# Patient Record
Sex: FEMALE | Race: WHITE | NOT HISPANIC OR LATINO | Employment: OTHER | URBAN - METROPOLITAN AREA
[De-identification: names, ages, dates, MRNs, and addresses within clinical notes are randomized per-mention and may not be internally consistent; named-entity substitution may affect disease eponyms.]

---

## 2019-04-16 ENCOUNTER — OFFICE VISIT (OUTPATIENT)
Dept: ORTHOPEDIC SURGERY | Facility: CLINIC | Age: 74
End: 2019-04-16

## 2019-04-16 VITALS — BODY MASS INDEX: 25.49 KG/M2 | TEMPERATURE: 98.3 F | WEIGHT: 135 LBS | HEIGHT: 61 IN

## 2019-04-16 DIAGNOSIS — M25.551 HIP PAIN, RIGHT: Primary | ICD-10-CM

## 2019-04-16 PROCEDURE — 73502 X-RAY EXAM HIP UNI 2-3 VIEWS: CPT | Performed by: ORTHOPAEDIC SURGERY

## 2019-04-16 PROCEDURE — 99213 OFFICE O/P EST LOW 20 MIN: CPT | Performed by: ORTHOPAEDIC SURGERY

## 2019-04-16 NOTE — PROGRESS NOTES
"Patient: Maria Victoria Garcia  YOB: 1945 73 y.o. female  Medical Record Number: [id number]    Chief Complaint:   Chief Complaint   Patient presents with   • Right Hip - Establish Care       History of Present Illness:Maria Victoria Garcia is a 73 y.o. female who presents for follow-up of right hip.  She is about 18 years out from revision right total hip replacement.  She has known extensive polyethylene wear.  She has soreness and achiness with intermittent catching.  It is worsened moderately since I saw her a couple years ago.    Allergies:   Allergies   Allergen Reactions   • Codeine        Medications:   No current outpatient medications on file.     No current facility-administered medications for this visit.          The following portions of the patient's history were reviewed and updated as appropriate: allergies, current medications, past family history, past medical history, past social history, past surgical history and problem list.    Review of Systems:   A 14 point review of systems was performed. All systems negative except pertinent positives/negative listed in HPI above    Physical Exam:   Vitals:    04/16/19 1425   Temp: 98.3 °F (36.8 °C)   TempSrc: Temporal   Weight: 61.2 kg (135 lb)   Height: 154.9 cm (61\")       General: A and O x 3, ASA, NAD    SCLERA:    Normal    DENTITION:   Normal  Hip:  right                          LEG ALIGNMENT:     Neutral   ,     leg lengths right about 1 inch longer than the left                          GAIT:     Antalgic with moderate Trendelenburg and a long leg gait on the right side                           SKIN:     No abnormality, there is a long straight lateral incision which is well-healed over the lateral aspect of the hip                           RANGE OF MOTION:      Full with mild  joint irritability                          STRENGTH:     4+ / 5    hip flexion and abduction                          DISTAL PULSES:    Paplable                   "        DISTAL SENSATION :   Intact                          LYMPHATICS:     No   lymphadenopathy                          OTHER:          - Negative Stinchfeld test                                                  - Negative log roll                                                  - No Tenderness to palpation trochanteric bursa                                                  - Neg FADIR                                                  - Neg ASHLEY                                                  - No SI tenderness          Radiology:  Xrays 2views (AP bilateral hips, and lateral of the hip) ordered and reviewed for evaluation of hip pain  demonstrating  a well positioned total hip without evidence of  loosening, or osteoarthritis. There is eccentric wear the femoral head consistent with moderate to severe polyethylene wear.    There is ostial lysis about the apex hole centrally.  She has a femoral head with a very long sleeve topics slightly vertical in orientation but otherwise his components appear appropriately positioned.    In comparison to previous films are has been slight progression of wear     Assessment/Plan:  Right hip mechanical symptoms with polyethylene wear.  There is moderate ostial lysis about the apex hole.  Which would need bone grafting The next step would be a polyethylene exchange.  She is very high risk for dislocation given the already lengthened hip the sub-optimal mechanical issues with a thick sleeve relative to the head diameter.  When she is ready to proceed with polyethylene exchange she will call and we will get it scheduled.  As a backup I would have explant and dual mobility with a readapt locking cup          Fred Cobb MD  4/16/2019

## 2020-03-17 ENCOUNTER — OFFICE VISIT (OUTPATIENT)
Dept: ORTHOPEDIC SURGERY | Facility: CLINIC | Age: 75
End: 2020-03-17

## 2020-03-17 VITALS — HEIGHT: 61 IN | WEIGHT: 136 LBS | TEMPERATURE: 97.9 F | BODY MASS INDEX: 25.68 KG/M2

## 2020-03-17 DIAGNOSIS — M25.551 HIP PAIN, RIGHT: Primary | ICD-10-CM

## 2020-03-17 DIAGNOSIS — Z96.641 STATUS POST RIGHT HIP REPLACEMENT: Primary | ICD-10-CM

## 2020-03-17 PROBLEM — M16.9 OA (OSTEOARTHRITIS) OF HIP: Status: ACTIVE | Noted: 2020-03-17

## 2020-03-17 PROCEDURE — 99214 OFFICE O/P EST MOD 30 MIN: CPT | Performed by: ORTHOPAEDIC SURGERY

## 2020-03-17 PROCEDURE — 73502 X-RAY EXAM HIP UNI 2-3 VIEWS: CPT | Performed by: ORTHOPAEDIC SURGERY

## 2020-03-17 RX ORDER — CEFAZOLIN SODIUM 2 G/100ML
2 INJECTION, SOLUTION INTRAVENOUS ONCE
Status: CANCELLED | OUTPATIENT
Start: 2020-07-29 | End: 2020-03-17

## 2020-03-17 RX ORDER — MELOXICAM 15 MG/1
15 TABLET ORAL ONCE
Status: CANCELLED | OUTPATIENT
Start: 2020-07-29 | End: 2020-03-17

## 2020-03-17 RX ORDER — VANCOMYCIN HYDROCHLORIDE 1 G/200ML
15 INJECTION, SOLUTION INTRAVENOUS ONCE
Status: CANCELLED | OUTPATIENT
Start: 2020-07-29 | End: 2020-03-17

## 2020-03-17 RX ORDER — PREGABALIN 75 MG/1
150 CAPSULE ORAL ONCE
Status: CANCELLED | OUTPATIENT
Start: 2020-07-29 | End: 2020-03-17

## 2020-03-17 NOTE — PROGRESS NOTES
"Patient: Maria Victoria Garcia  YOB: 1945 73 y.o. female  Medical Record Number: [id number]     Chief Complaint:       Chief Complaint   Patient presents with   • Right Hip - Establish Care         History of Present Illness:Maria Victoria Garcia is a 73 y.o. female who presents for follow-up of right hip.  She is about 19 years out from revision right total hip replacement.  She has known extensive polyethylene wear.  She has soreness and achiness with intermittent catching.  It is worsened moderately since I saw her a couple years ago.It us worse than last year.     Allergies:        Allergies   Allergen Reactions   • Codeine           Medications:   No current outpatient medications on file.      No current facility-administered medications for this visit.             The following portions of the patient's history were reviewed and updated as appropriate: allergies, current medications, past family history, past medical history, past social history, past surgical history and problem list.     Review of Systems:   A 14 point review of systems was performed. All systems negative except pertinent positives/negative listed in HPI above     Physical Exam:       Vitals:     04/16/19 1425   Temp: 98.3 °F (36.8 °C)   TempSrc: Temporal   Weight: 61.2 kg (135 lb)   Height: 154.9 cm (61\")         General: A and O x 3, ASA, NAD                          SCLERA:    Normal                          DENTITION:   Normal  Hip:  right                          LEG ALIGNMENT:     Neutral   ,     leg lengths right about 1 inch longer than the left                          GAIT:     Antalgic with moderate Trendelenburg and a long leg gait on the right side                           SKIN:     No abnormality, there is a long straight lateral incision which is well-healed over the lateral aspect of the hip                           RANGE OF MOTION:      Full with mild  joint irritability                          STRENGTH:     4+ / " 5    hip flexion and abduction                          DISTAL PULSES:    Paplable                          DISTAL SENSATION :   Intact                          LYMPHATICS:     No   lymphadenopathy                          OTHER:          - Negative Stinchfeld test                                                  - Negative log roll                                                  - No Tenderness to palpation trochanteric bursa                                                  - Neg FADIR                                                  - Neg ASHLEY                                                  - No SI tenderness           Radiology:  Xrays 2views (AP bilateral hips, and lateral of the hip) ordered and reviewed for evaluation of hip pain  demonstrating  a well positioned total hip without evidence of  loosening, or osteoarthritis. There is eccentric wear the femoral head consistent with moderate to severe polyethylene wear.    There is ostial lysis about the apex hole centrally.  She has a femoral head with a very long sleeve topics slightly vertical in orientation but otherwise his components appear appropriately positioned.    In comparison to previous films are has been slight progression of wear     Assessment/Plan:  Right hip mechanical symptoms with polyethylene wear.  There is moderate ostial lysis about the apex hole.  Which would need bone grafting The next step would be a polyethylene exchange.  She is very high risk for dislocation given the already lengthened hip the sub-optimal mechanical issues with a thick sleeve relative to the head diameter.  We will get it scheduled.  As a backup I would have explant and dual mobility with a readapt locking cup

## 2020-05-29 PROBLEM — Z96.641 STATUS POST RIGHT HIP REPLACEMENT: Status: ACTIVE | Noted: 2020-05-29

## 2020-07-23 ENCOUNTER — OFFICE VISIT (OUTPATIENT)
Dept: ORTHOPEDIC SURGERY | Facility: CLINIC | Age: 75
End: 2020-07-23

## 2020-07-23 VITALS
TEMPERATURE: 98.2 F | BODY MASS INDEX: 26.58 KG/M2 | SYSTOLIC BLOOD PRESSURE: 148 MMHG | DIASTOLIC BLOOD PRESSURE: 84 MMHG | HEIGHT: 61 IN | WEIGHT: 140.8 LBS

## 2020-07-23 DIAGNOSIS — Z96.641 STATUS POST RIGHT HIP REPLACEMENT: Primary | ICD-10-CM

## 2020-07-23 PROCEDURE — S0260 H&P FOR SURGERY: HCPCS | Performed by: NURSE PRACTITIONER

## 2020-07-23 PROCEDURE — 73501 X-RAY EXAM HIP UNI 1 VIEW: CPT | Performed by: NURSE PRACTITIONER

## 2020-07-23 NOTE — H&P
"Patient: Maria Victoria Garcia    Date of Admission: 7/29/2020    YOB: 1945    Medical Record Number: [id number]    Admitting Physician: Dr. Fred Cobb    Reason for Admission: End Stage Right Hip OA    History of Present Illness: 75 y.o. female presents with severe hip pain.  She has known advanced polyethylene wear.  She is tried and failed all conservative measures and would like to proceed with surgery.    Allergies:   Allergies   Allergen Reactions   • Codeine          Current Medications:  Home Medications:    No current outpatient medications on file prior to visit.     No current facility-administered medications on file prior to visit.      PRN Meds:.    PMH:  Past Medical History:   Diagnosis Date   • Cancer (CMS/HCC)         PSURGH:  Past Surgical History:   Procedure Laterality Date   • GALLBLADDER SURGERY  08/1998   • HIP SURGERY Bilateral 1989    ALSO 1991; REPLACEMENT       SocialHx:  Social History     Occupational History   • Not on file   Tobacco Use   • Smoking status: Never Smoker   Substance and Sexual Activity   • Alcohol use: No   • Drug use: Not on file   • Sexual activity: Not on file      Social History     Social History Narrative   • Not on file       FamHx:  Family History   Problem Relation Age of Onset   • Lung cancer Other    • Pancreatic cancer Other          Review of Systems:   A 14 point review of systems was performed, pertinent positives discussed above, all other systems are negative    Physical Exam: 75 y.o. female  Vital Signs :   Vitals:    07/23/20 1339   BP: 148/84   Temp: 98.2 °F (36.8 °C)   Weight: 63.9 kg (140 lb 12.8 oz)   Height: 154.9 cm (61\")   PainSc:   6     General: Alert and Oriented x 3, No acute distress.  Psych: mood and affect appropriate; recent and remote memory intact  Eyes: conjunctiva clear; pupils equally round and reactive, sclera nonicteric  CV: no peripheral edema  Resp: normal respiratory effort  Skin: no rashes or wounds; normal " turgor  Musculosketetal; pain with hip range of motion. Positive stinchfeld test. No trochanteric  Tenderness.  Vascular: palpable distal pulses    Labs:    No visits with results within 3 Week(s) from this visit.   Latest known visit with results is:   No results found for any previous visit.     Xrays:  Previous x-rays of the right hip show previously replaced right total hip replacement with signs of polyethylene wear.    Assessment: Advanced polyethylene wear right hip. Conservative measures have failed.      Plan:  The plan is to proceed with Right hip polyethylene exchange with possible acetabular revision. The patient voiced understanding of the risks, benefits, and alternative forms of treatment that were discussed with Dr Cobb at the time of scheduling.  Patient is planning on home with home health next day.  Spinal anesthesia.    Jeannie Pool, APRN  7/23/2020

## 2020-07-27 ENCOUNTER — APPOINTMENT (OUTPATIENT)
Dept: PREADMISSION TESTING | Facility: HOSPITAL | Age: 75
End: 2020-07-27

## 2020-11-30 ENCOUNTER — PREP FOR SURGERY (OUTPATIENT)
Dept: OTHER | Facility: HOSPITAL | Age: 75
End: 2020-11-30

## 2020-11-30 DIAGNOSIS — Z96.641 STATUS POST RIGHT HIP REPLACEMENT: Primary | ICD-10-CM

## 2020-12-01 ENCOUNTER — TRANSCRIBE ORDERS (OUTPATIENT)
Dept: PREADMISSION TESTING | Facility: HOSPITAL | Age: 75
End: 2020-12-01

## 2020-12-01 DIAGNOSIS — Z01.818 OTHER SPECIFIED PRE-OPERATIVE EXAMINATION: Primary | ICD-10-CM

## 2020-12-03 ENCOUNTER — APPOINTMENT (OUTPATIENT)
Dept: PREADMISSION TESTING | Facility: HOSPITAL | Age: 75
End: 2020-12-03

## 2020-12-03 VITALS
TEMPERATURE: 98 F | RESPIRATION RATE: 16 BRPM | BODY MASS INDEX: 26.43 KG/M2 | DIASTOLIC BLOOD PRESSURE: 90 MMHG | SYSTOLIC BLOOD PRESSURE: 164 MMHG | WEIGHT: 140 LBS | HEIGHT: 61 IN | OXYGEN SATURATION: 100 % | HEART RATE: 92 BPM

## 2020-12-03 DIAGNOSIS — Z96.641 STATUS POST RIGHT HIP REPLACEMENT: ICD-10-CM

## 2020-12-03 LAB
BILIRUB UR QL STRIP: NEGATIVE
CLARITY UR: CLEAR
COLOR UR: YELLOW
DEPRECATED RDW RBC AUTO: 40 FL (ref 37–54)
ERYTHROCYTE [DISTWIDTH] IN BLOOD BY AUTOMATED COUNT: 12.4 % (ref 12.3–15.4)
GLUCOSE UR STRIP-MCNC: NEGATIVE MG/DL
HCT VFR BLD AUTO: 38.7 % (ref 34–46.6)
HGB BLD-MCNC: 13.1 G/DL (ref 12–15.9)
HGB UR QL STRIP.AUTO: NEGATIVE
KETONES UR QL STRIP: ABNORMAL
LEUKOCYTE ESTERASE UR QL STRIP.AUTO: NEGATIVE
MCH RBC QN AUTO: 29.8 PG (ref 26.6–33)
MCHC RBC AUTO-ENTMCNC: 33.9 G/DL (ref 31.5–35.7)
MCV RBC AUTO: 88.2 FL (ref 79–97)
NITRITE UR QL STRIP: NEGATIVE
PH UR STRIP.AUTO: <=5 [PH] (ref 5–8)
PLATELET # BLD AUTO: 245 10*3/MM3 (ref 140–450)
PMV BLD AUTO: 9.7 FL (ref 6–12)
PROT UR QL STRIP: NEGATIVE
QT INTERVAL: 395 MS
RBC # BLD AUTO: 4.39 10*6/MM3 (ref 3.77–5.28)
SP GR UR STRIP: 1.02 (ref 1–1.03)
UROBILINOGEN UR QL STRIP: ABNORMAL
WBC # BLD AUTO: 7.64 10*3/MM3 (ref 3.4–10.8)

## 2020-12-03 PROCEDURE — 36415 COLL VENOUS BLD VENIPUNCTURE: CPT

## 2020-12-03 PROCEDURE — 93010 ELECTROCARDIOGRAM REPORT: CPT | Performed by: INTERNAL MEDICINE

## 2020-12-03 PROCEDURE — 81003 URINALYSIS AUTO W/O SCOPE: CPT

## 2020-12-03 PROCEDURE — 85027 COMPLETE CBC AUTOMATED: CPT

## 2020-12-03 PROCEDURE — 93005 ELECTROCARDIOGRAM TRACING: CPT

## 2020-12-03 ASSESSMENT — HOOS JR
HOOS JR SCORE: 58.93
HOOS JR SCORE: 10

## 2020-12-03 NOTE — DISCHARGE INSTRUCTIONS
Take the following medications the morning of surgery:    NONE    TIME OF ARRIVAL WILL BE GIVEN TO YOU BY DR MON'S OFFICE     If you are on prescription narcotic pain medication to control your pain you may also take that medication the morning of surgery.    General Instructions:  • Do not eat solid food after midnight the night before surgery.  • You may drink clear liquids day of surgery but must stop at least one hour before your hospital arrival time.  • It is beneficial for you to have a clear drink that contains carbohydrates the day of surgery.  We suggest a 12 to 20 ounce bottle of Gatorade or Powerade for non-diabetic patients or a 12 to 20 ounce bottle of G2 or Powerade Zero for diabetic patients. (Pediatric patients, are not advised to drink a 12 to 20 ounce carbohydrate drink)    Clear liquids are liquids you can see through.  Nothing red in color.     Plain water                               Sports drinks  Sodas                                   Gelatin (Jell-O)  Fruit juices without pulp such as white grape juice and apple juice  Popsicles that contain no fruit or yogurt  Tea or coffee (no cream or milk added)  Gatorade / Powerade  G2 / Powerade Zero    • Infants may have breast milk up to four hours before surgery.  • Infants drinking formula may drink formula up to six hours before surgery.   • Patients who avoid smoking, chewing tobacco and alcohol for 4 weeks prior to surgery have a reduced risk of post-operative complications.  Quit smoking as many days before surgery as you can.  • Do not smoke, use chewing tobacco or drink alcohol the day of surgery.   • If applicable bring your C-PAP/ BI-PAP machine.  • Bring any papers given to you in the doctor’s office.  • Wear clean comfortable clothes.  • Do not wear contact lenses, false eyelashes or make-up.  Bring a case for your glasses.   • Bring crutches or walker if applicable.  • Remove all piercings.  Leave jewelry and any other valuables at  home.  • Hair extensions with metal clips must be removed prior to surgery.  • The Pre-Admission Testing nurse will instruct you to bring medications if unable to obtain an accurate list in Pre-Admission Testing.        If you were given a blood bank ID arm band remember to bring it with you the day of surgery.    Preventing a Surgical Site Infection:  • For 2 to 3 days before surgery, avoid shaving with a razor because the razor can irritate skin and make it easier to develop an infection.    • Any areas of open skin can increase the risk of a post-operative wound infection by allowing bacteria to enter and travel throughout the body.  Notify your surgeon if you have any skin wounds / rashes even if it is not near the expected surgical site.  The area will need assessed to determine if surgery should be delayed until it is healed.  • The night prior to surgery shower using a fresh bar of anti-bacterial soap (such as Dial) and clean washcloth.  Sleep in a clean bed with clean clothing.  Do not allow pets to sleep with you.  • Shower on the morning of surgery using a fresh bar of anti-bacterial soap (such as Dial) and clean washcloth.  Dry with a clean towel and dress in clean clothing.  • Ask your surgeon if you will be receiving antibiotics prior to surgery.  • Make sure you, your family, and all healthcare providers clean their hands with soap and water or an alcohol based hand  before caring for you or your wound.    Day of surgery:  Your arrival time is approximately two hours before your scheduled surgery time.  Upon arrival, a Pre-op nurse and Anesthesiologist will review your health history, obtain vital signs, and answer questions you may have.  The only belongings needed at this time will be a list of your home medications and if applicable your C-PAP/BI-PAP machine.  A Pre-op nurse will start an IV and you may receive medication in preparation for surgery, including something to help you relax.      Please be aware that surgery does come with discomfort.  We want to make every effort to control your discomfort so please discuss any uncontrolled symptoms with your nurse.   Your doctor will most likely have prescribed pain medications.      If you are going home after surgery you will receive individualized written care instructions before being discharged.  A responsible adult must drive you to and from the hospital on the day of your surgery and stay with you for 24 hours.    If you are staying overnight following surgery, you will be transported to your hospital room following the recovery period.  [facility] has all private rooms.    If you have any questions please call Pre-Admission Testing at [phone].  Deductibles and co-payments are collected on the day of service. Please be prepared to pay the required co-pay, deductible or deposit on the day of service as defined by your plan.    Patient Education for Self-Quarantine Process    Following your COVID testing, we strongly recommend that you do not leave your home after you have been tested for COVID except to get medical care. This includes not going to work, school or to public areas.  If this is not possible for you to do please limit your activities to only required outings.  Be sure to wear a mask when you are with other people, practice social distancing and wash your hands frequently.      The following items provide additional details to keep you safe.  • Wash your hands with soap and water frequently for at least 20 seconds.   • Avoid touching your eyes, nose and mouth with unwashed hands.  • Do not share anything - utensils, towels, food from the same bowl.   • Have your own utensils, drinking glass, dishes, towels and bedding.   • Do not have visitors.   • Do use FaceTime to stay in touch with family and friends.  • You should stay in a specific room away from others if possible.   • Stay at least 6 feet away from others  in the home if you cannot have a dedicated room to yourself.   • Do not snuggle with your pet. While the CDC says there is no evidence that pets can spread COVID-19 or be infected from humans, it is probably best to avoid “petting, snuggling, being kissed or licked and sharing food (during self-quarantine)”, according to the CDC.   • Sanitize household surfaces daily. Include all high touch areas (door handles, light switches, phones, countertops, etc.)  • Do not share a bathroom with others, if possible.   • Wear a mask around others in your home if you are unable to stay in a separate room or 6 feet apart. If  you are unable to wear a mask, have your family member wear a mask if they must be within 6 feet of you.   Call your surgeon immediately if you experience any of the following symptoms:  • Sore Throat  • Shortness of Breath or difficulty breathing  • Cough  • Chills  • Body soreness or muscle pain  • Headache  • Fever  • New loss of taste or smell  • Do not arrive for your surgery ill.  Your procedure will need to be rescheduled to another time.  You will need to call your physician before the day of surgery to avoid any unnecessary exposure to hospital staff as well as other patients.    CHLORHEXIDINE CLOTH INSTRUCTIONS  The morning of surgery follow these instructions using the Chlorhexidine cloths you've been given.  These steps reduce bacteria on the body.  Do not use the cloths near your eyes, ears mouth, genitalia or on open wounds.  Throw the cloths away after use but do not try to flush them down a toilet.      • Open and remove one cloth at a time from the package.    • Leave the cloth unfolded and begin the bathing.  • Massage the skin with the cloths using gentle pressure to remove bacteria.  Do not scrub harshly.   • Follow the steps below with one 2% CHG cloth per area (6 total cloths).  • One cloth for neck, shoulders and chest.  • One cloth for both arms, hands, fingers and underarms (do  underarms last).  • One cloth for the abdomen followed by groin.  • One cloth for right leg and foot including between the toes.  • One cloth for left leg and foot including between the toes.  • The last cloth is to be used for the back of the neck, back and buttocks.    Allow the CHG to air dry 3 minutes on the skin which will give it time to work and decrease the chance of irritation.  The skin may feel sticky until it is dry.  Do not rinse with water or any other liquid or you will lose the beneficial effects of the CHG.  If mild skin irritation occurs, do rinse the skin to remove the CHG.  Report this to the nurse at time of admission.  Do not apply lotions, creams, ointments, deodorants or perfumes after using the clothes. Dress in clean clothes before coming to the hospital.    BACTROBAN NASAL OINTMENT  There are many germs normally in your nose. Bactroban is an ointment that will help reduce these germs. Please follow these instructions for Bactroban use:      ____The day before surgery in the morning  Date________    ____The day before surgery in the evening              Date________    ____The day of surgery in the morning    Date________    **Squirt ½ package of Bactroban Ointment onto a cotton applicator and apply to inside of 1st nostril.  Squirt the remaining Bactroban and apply to the inside of the other nostril.    PERIDEX- ORAL:  Use only if your surgeon has ordered  Use the night before and morning of surgery - Swish, gargle, and spit - do not swallow.

## 2020-12-10 ENCOUNTER — OFFICE VISIT (OUTPATIENT)
Dept: ORTHOPEDIC SURGERY | Facility: CLINIC | Age: 75
End: 2020-12-10

## 2020-12-10 VITALS
TEMPERATURE: 97.5 F | SYSTOLIC BLOOD PRESSURE: 148 MMHG | BODY MASS INDEX: 27.94 KG/M2 | WEIGHT: 138.6 LBS | DIASTOLIC BLOOD PRESSURE: 52 MMHG | HEIGHT: 59 IN

## 2020-12-10 DIAGNOSIS — Z96.641 STATUS POST RIGHT HIP REPLACEMENT: Primary | ICD-10-CM

## 2020-12-10 PROCEDURE — S0260 H&P FOR SURGERY: HCPCS | Performed by: NURSE PRACTITIONER

## 2020-12-10 NOTE — H&P (VIEW-ONLY)
Patient: Maria Victoria Garcia    Date of Admission: 12/16/2020    YOB: 1945    Medical Record Number: [id number]    Admitting Physician: Dr. Fred Cobb    Reason for Admission: Right hip polyexchange    History of Present Illness: 75 y.o. 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  She has tried and failed all conservative measures would like to proceed with polyexchange    Allergies:   Allergies   Allergen Reactions   • Codeine Nausea And Vomiting         Current Medications:  Home Medications:    Current Outpatient Medications on File Prior to Visit   Medication Sig   • Chlorhexidine Gluconate 2 % pads Apply  topically. As directed pre op   • mupirocin (BACTROBAN) 2 % nasal ointment into the nostril(s) as directed by provider.     Current Facility-Administered Medications on File Prior to Visit   Medication   • Chlorhexidine Gluconate 2 % pads 1 each     PRN Meds:.    PMH:  Past Medical History:   Diagnosis Date   • Cancer (CMS/HCC)    • History of gallbladder cancer    • PONV (postoperative nausea and vomiting)    • Right hip pain         PSURGH:  Past Surgical History:   Procedure Laterality Date   • APPENDECTOMY     • COLONOSCOPY     • GALLBLADDER SURGERY  08/1998   • HIP SURGERY Bilateral 1989    ALSO 1991; REPLACEMENT   • NASAL SEPTUM SURGERY     • ORIF ANKLE FRACTURE Left        SocialHx:  Social History     Occupational History   • Not on file   Tobacco Use   • Smoking status: Never Smoker   Substance and Sexual Activity   • Alcohol use: No   • Drug use: Never   • Sexual activity: Defer      Social History     Social History Narrative   • Not on file       FamHx:  Family History   Problem Relation Age of Onset   • Lung cancer Other    • Pancreatic cancer Other    • Malig Hyperthermia Neg Hx          Review of Systems:   A 14 point review of systems was performed, pertinent positives discussed above, all other systems  "are negative    Physical Exam: 75 y.o. female  Vital Signs :   Vitals:    12/10/20 1318   BP: 148/52   Temp: 97.5 °F (36.4 °C)   Weight: 62.9 kg (138 lb 9.6 oz)   Height: 149.9 cm (59\")   PainSc:   5     General: Alert and Oriented x 3, No acute distress.  Psych: mood and affect appropriate; recent and remote memory intact  Eyes: conjunctiva clear; pupils equally round and reactive, sclera nonicteric  CV: no peripheral edema  Resp: normal respiratory effort  Skin: no rashes or wounds; normal turgor  Musculosketetal; pain with hip range of motion. Positive stinchfeld test. No trochanteric  Tenderness.  Vascular: palpable distal pulses    Labs:    Appointment on 12/03/2020   Component Date Value Ref Range Status   • WBC 12/03/2020 7.64  3.40 - 10.80 10*3/mm3 Final   • RBC 12/03/2020 4.39  3.77 - 5.28 10*6/mm3 Final   • Hemoglobin 12/03/2020 13.1  12.0 - 15.9 g/dL Final   • Hematocrit 12/03/2020 38.7  34.0 - 46.6 % Final   • MCV 12/03/2020 88.2  79.0 - 97.0 fL Final   • MCH 12/03/2020 29.8  26.6 - 33.0 pg Final   • MCHC 12/03/2020 33.9  31.5 - 35.7 g/dL Final   • RDW 12/03/2020 12.4  12.3 - 15.4 % Final   • RDW-SD 12/03/2020 40.0  37.0 - 54.0 fl Final   • MPV 12/03/2020 9.7  6.0 - 12.0 fL Final   • Platelets 12/03/2020 245  140 - 450 10*3/mm3 Final   • QT Interval 12/03/2020 395  ms Final   • Color, UA 12/03/2020 Yellow  Yellow, Straw Final   • Appearance, UA 12/03/2020 Clear  Clear Final   • pH, UA 12/03/2020 <=5.0  5.0 - 8.0 Final   • Specific Gravity, UA 12/03/2020 1.017  1.005 - 1.030 Final   • Glucose, UA 12/03/2020 Negative  Negative Final   • Ketones, UA 12/03/2020 Trace* Negative Final   • Bilirubin, UA 12/03/2020 Negative  Negative Final   • Blood, UA 12/03/2020 Negative  Negative Final   • Protein, UA 12/03/2020 Negative  Negative Final   • Leuk Esterase, UA 12/03/2020 Negative  Negative Final   • Nitrite, UA 12/03/2020 Negative  Negative Final   • Urobilinogen, UA 12/03/2020 0.2 E.U./dL  0.2 - 1.0 E.U./dL " Final     Xrays:  Previous x-rays were reviewed show previously replaced right hip with signs of polyethylene wear    Assessment: Advanced polyethylene wear right hip conservative measures have failed.      Plan:  The plan is to proceed with right hip polyexchange patient is planning on going home the next day    Jeannie Pool, APRN  12/10/2020

## 2020-12-10 NOTE — H&P
Patient: Maria Victoria Garcia    Date of Admission: 12/16/2020    YOB: 1945    Medical Record Number: [id number]    Admitting Physician: Dr. Fred Cobb    Reason for Admission: Right hip polyexchange    History of Present Illness: 75 y.o. 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  She has tried and failed all conservative measures would like to proceed with polyexchange    Allergies:   Allergies   Allergen Reactions   • Codeine Nausea And Vomiting         Current Medications:  Home Medications:    Current Outpatient Medications on File Prior to Visit   Medication Sig   • Chlorhexidine Gluconate 2 % pads Apply  topically. As directed pre op   • mupirocin (BACTROBAN) 2 % nasal ointment into the nostril(s) as directed by provider.     Current Facility-Administered Medications on File Prior to Visit   Medication   • Chlorhexidine Gluconate 2 % pads 1 each     PRN Meds:.    PMH:  Past Medical History:   Diagnosis Date   • Cancer (CMS/HCC)    • History of gallbladder cancer    • PONV (postoperative nausea and vomiting)    • Right hip pain         PSURGH:  Past Surgical History:   Procedure Laterality Date   • APPENDECTOMY     • COLONOSCOPY     • GALLBLADDER SURGERY  08/1998   • HIP SURGERY Bilateral 1989    ALSO 1991; REPLACEMENT   • NASAL SEPTUM SURGERY     • ORIF ANKLE FRACTURE Left        SocialHx:  Social History     Occupational History   • Not on file   Tobacco Use   • Smoking status: Never Smoker   Substance and Sexual Activity   • Alcohol use: No   • Drug use: Never   • Sexual activity: Defer      Social History     Social History Narrative   • Not on file       FamHx:  Family History   Problem Relation Age of Onset   • Lung cancer Other    • Pancreatic cancer Other    • Malig Hyperthermia Neg Hx          Review of Systems:   A 14 point review of systems was performed, pertinent positives discussed above, all other systems  "are negative    Physical Exam: 75 y.o. female  Vital Signs :   Vitals:    12/10/20 1318   BP: 148/52   Temp: 97.5 °F (36.4 °C)   Weight: 62.9 kg (138 lb 9.6 oz)   Height: 149.9 cm (59\")   PainSc:   5     General: Alert and Oriented x 3, No acute distress.  Psych: mood and affect appropriate; recent and remote memory intact  Eyes: conjunctiva clear; pupils equally round and reactive, sclera nonicteric  CV: no peripheral edema  Resp: normal respiratory effort  Skin: no rashes or wounds; normal turgor  Musculosketetal; pain with hip range of motion. Positive stinchfeld test. No trochanteric  Tenderness.  Vascular: palpable distal pulses    Labs:    Appointment on 12/03/2020   Component Date Value Ref Range Status   • WBC 12/03/2020 7.64  3.40 - 10.80 10*3/mm3 Final   • RBC 12/03/2020 4.39  3.77 - 5.28 10*6/mm3 Final   • Hemoglobin 12/03/2020 13.1  12.0 - 15.9 g/dL Final   • Hematocrit 12/03/2020 38.7  34.0 - 46.6 % Final   • MCV 12/03/2020 88.2  79.0 - 97.0 fL Final   • MCH 12/03/2020 29.8  26.6 - 33.0 pg Final   • MCHC 12/03/2020 33.9  31.5 - 35.7 g/dL Final   • RDW 12/03/2020 12.4  12.3 - 15.4 % Final   • RDW-SD 12/03/2020 40.0  37.0 - 54.0 fl Final   • MPV 12/03/2020 9.7  6.0 - 12.0 fL Final   • Platelets 12/03/2020 245  140 - 450 10*3/mm3 Final   • QT Interval 12/03/2020 395  ms Final   • Color, UA 12/03/2020 Yellow  Yellow, Straw Final   • Appearance, UA 12/03/2020 Clear  Clear Final   • pH, UA 12/03/2020 <=5.0  5.0 - 8.0 Final   • Specific Gravity, UA 12/03/2020 1.017  1.005 - 1.030 Final   • Glucose, UA 12/03/2020 Negative  Negative Final   • Ketones, UA 12/03/2020 Trace* Negative Final   • Bilirubin, UA 12/03/2020 Negative  Negative Final   • Blood, UA 12/03/2020 Negative  Negative Final   • Protein, UA 12/03/2020 Negative  Negative Final   • Leuk Esterase, UA 12/03/2020 Negative  Negative Final   • Nitrite, UA 12/03/2020 Negative  Negative Final   • Urobilinogen, UA 12/03/2020 0.2 E.U./dL  0.2 - 1.0 E.U./dL " Final     Xrays:  Previous x-rays were reviewed show previously replaced right hip with signs of polyethylene wear    Assessment: Advanced polyethylene wear right hip conservative measures have failed.      Plan:  The plan is to proceed with right hip polyexchange patient is planning on going home the next day    Jeannie Pool, APRN  12/10/2020

## 2020-12-14 ENCOUNTER — LAB (OUTPATIENT)
Dept: LAB | Facility: HOSPITAL | Age: 75
End: 2020-12-14

## 2020-12-14 DIAGNOSIS — Z01.818 OTHER SPECIFIED PRE-OPERATIVE EXAMINATION: ICD-10-CM

## 2020-12-14 PROCEDURE — C9803 HOPD COVID-19 SPEC COLLECT: HCPCS

## 2020-12-14 PROCEDURE — U0004 COV-19 TEST NON-CDC HGH THRU: HCPCS

## 2020-12-15 LAB — SARS-COV-2 RNA RESP QL NAA+PROBE: NOT DETECTED

## 2020-12-16 ENCOUNTER — ANESTHESIA EVENT (OUTPATIENT)
Dept: PERIOP | Facility: HOSPITAL | Age: 75
End: 2020-12-16

## 2020-12-16 ENCOUNTER — HOSPITAL ENCOUNTER (INPATIENT)
Facility: HOSPITAL | Age: 75
LOS: 1 days | Discharge: HOME HEALTH | End: 2020-12-17
Attending: ORTHOPAEDIC SURGERY | Admitting: ORTHOPAEDIC SURGERY

## 2020-12-16 ENCOUNTER — APPOINTMENT (OUTPATIENT)
Dept: GENERAL RADIOLOGY | Facility: HOSPITAL | Age: 75
End: 2020-12-16

## 2020-12-16 ENCOUNTER — ANESTHESIA (OUTPATIENT)
Dept: PERIOP | Facility: HOSPITAL | Age: 75
End: 2020-12-16

## 2020-12-16 DIAGNOSIS — Z96.641 STATUS POST RIGHT HIP REPLACEMENT: ICD-10-CM

## 2020-12-16 LAB
ABO GROUP BLD: NORMAL
ANION GAP SERPL CALCULATED.3IONS-SCNC: 10 MMOL/L (ref 5–15)
BLD GP AB SCN SERPL QL: NEGATIVE
BUN SERPL-MCNC: 11 MG/DL (ref 8–23)
BUN/CREAT SERPL: 14.1 (ref 7–25)
CALCIUM SPEC-SCNC: 8.9 MG/DL (ref 8.6–10.5)
CHLORIDE SERPL-SCNC: 103 MMOL/L (ref 98–107)
CO2 SERPL-SCNC: 26 MMOL/L (ref 22–29)
CREAT SERPL-MCNC: 0.78 MG/DL (ref 0.57–1)
GFR SERPL CREATININE-BSD FRML MDRD: 72 ML/MIN/1.73
GLUCOSE SERPL-MCNC: 93 MG/DL (ref 65–99)
POTASSIUM SERPL-SCNC: 3.9 MMOL/L (ref 3.5–5.2)
RH BLD: POSITIVE
SODIUM SERPL-SCNC: 139 MMOL/L (ref 136–145)
T&S EXPIRATION DATE: NORMAL

## 2020-12-16 PROCEDURE — 73501 X-RAY EXAM HIP UNI 1 VIEW: CPT

## 2020-12-16 PROCEDURE — 25010000002 ONDANSETRON PER 1 MG: Performed by: ANESTHESIOLOGY

## 2020-12-16 PROCEDURE — 27137 REVISE HIP JOINT REPLACEMENT: CPT | Performed by: ORTHOPAEDIC SURGERY

## 2020-12-16 PROCEDURE — 25010000002 FENTANYL CITRATE (PF) 100 MCG/2ML SOLUTION: Performed by: ANESTHESIOLOGY

## 2020-12-16 PROCEDURE — 25010000002 DEXAMETHASONE PER 1 MG: Performed by: ANESTHESIOLOGY

## 2020-12-16 PROCEDURE — 0SUA09Z SUPPLEMENT RIGHT HIP JOINT, ACETABULAR SURFACE WITH LINER, OPEN APPROACH: ICD-10-PCS | Performed by: ORTHOPAEDIC SURGERY

## 2020-12-16 PROCEDURE — 86901 BLOOD TYPING SEROLOGIC RH(D): CPT | Performed by: ORTHOPAEDIC SURGERY

## 2020-12-16 PROCEDURE — 0SP909Z REMOVAL OF LINER FROM RIGHT HIP JOINT, OPEN APPROACH: ICD-10-PCS | Performed by: ORTHOPAEDIC SURGERY

## 2020-12-16 PROCEDURE — C1776 JOINT DEVICE (IMPLANTABLE): HCPCS | Performed by: ORTHOPAEDIC SURGERY

## 2020-12-16 PROCEDURE — 25010000002 EPINEPHRINE 30 MG/30ML SOLUTION: Performed by: ORTHOPAEDIC SURGERY

## 2020-12-16 PROCEDURE — 0SPR0JZ REMOVAL OF SYNTHETIC SUBSTITUTE FROM RIGHT HIP JOINT, FEMORAL SURFACE, OPEN APPROACH: ICD-10-PCS | Performed by: ORTHOPAEDIC SURGERY

## 2020-12-16 PROCEDURE — 25010000002 MORPHINE PER 10 MG: Performed by: ORTHOPAEDIC SURGERY

## 2020-12-16 PROCEDURE — 25010000002 KETOROLAC TROMETHAMINE PER 15 MG: Performed by: NURSE PRACTITIONER

## 2020-12-16 PROCEDURE — 25010000003 CEFAZOLIN IN DEXTROSE 2-4 GM/100ML-% SOLUTION: Performed by: ORTHOPAEDIC SURGERY

## 2020-12-16 PROCEDURE — 86900 BLOOD TYPING SEROLOGIC ABO: CPT | Performed by: ORTHOPAEDIC SURGERY

## 2020-12-16 PROCEDURE — 86850 RBC ANTIBODY SCREEN: CPT | Performed by: ORTHOPAEDIC SURGERY

## 2020-12-16 PROCEDURE — 25010000002 ROPIVACAINE PER 1 MG: Performed by: ORTHOPAEDIC SURGERY

## 2020-12-16 PROCEDURE — 25010000002 PHENYLEPHRINE PER 1 ML: Performed by: ANESTHESIOLOGY

## 2020-12-16 PROCEDURE — 80048 BASIC METABOLIC PNL TOTAL CA: CPT | Performed by: ORTHOPAEDIC SURGERY

## 2020-12-16 PROCEDURE — 0SRR0JA REPLACEMENT OF RIGHT HIP JOINT, FEMORAL SURFACE WITH SYNTHETIC SUBSTITUTE, UNCEMENTED, OPEN APPROACH: ICD-10-PCS | Performed by: ORTHOPAEDIC SURGERY

## 2020-12-16 PROCEDURE — 25010000002 VANCOMYCIN PER 500 MG: Performed by: ORTHOPAEDIC SURGERY

## 2020-12-16 PROCEDURE — 25010000002 KETOROLAC TROMETHAMINE PER 15 MG: Performed by: ORTHOPAEDIC SURGERY

## 2020-12-16 PROCEDURE — 25010000002 PROPOFOL 10 MG/ML EMULSION: Performed by: ANESTHESIOLOGY

## 2020-12-16 DEVICE — VIOLET ANTIBACTERIAL POLYDIOXANONE, KNOTLESS TISSUE CONTROL DEVICE
Type: IMPLANTABLE DEVICE | Site: HIP | Status: FUNCTIONAL
Brand: STRATAFIX

## 2020-12-16 DEVICE — TOTAL HIP BALL FEMORAL HEAD DIAMETER 32MM +18 14/16 TAPER: Type: IMPLANTABLE DEVICE | Site: HIP | Status: FUNCTIONAL

## 2020-12-16 DEVICE — DURALOC MARATHON ACETABULAR LINER 10 DEGREES LIP 32MM ID 52MM OD
Type: IMPLANTABLE DEVICE | Site: HIP | Status: FUNCTIONAL
Brand: DURALOC

## 2020-12-16 RX ORDER — FENTANYL CITRATE 50 UG/ML
50 INJECTION, SOLUTION INTRAMUSCULAR; INTRAVENOUS
Status: DISCONTINUED | OUTPATIENT
Start: 2020-12-16 | End: 2020-12-16 | Stop reason: HOSPADM

## 2020-12-16 RX ORDER — CEFAZOLIN SODIUM 2 G/100ML
2 INJECTION, SOLUTION INTRAVENOUS EVERY 8 HOURS
Status: COMPLETED | OUTPATIENT
Start: 2020-12-17 | End: 2020-12-17

## 2020-12-16 RX ORDER — GLYCOPYRROLATE 0.2 MG/ML
INJECTION INTRAMUSCULAR; INTRAVENOUS AS NEEDED
Status: DISCONTINUED | OUTPATIENT
Start: 2020-12-16 | End: 2020-12-16 | Stop reason: SURG

## 2020-12-16 RX ORDER — ASPIRIN 81 MG/1
81 TABLET ORAL EVERY 12 HOURS SCHEDULED
Status: DISCONTINUED | OUTPATIENT
Start: 2020-12-17 | End: 2020-12-17 | Stop reason: HOSPADM

## 2020-12-16 RX ORDER — PREGABALIN 75 MG/1
150 CAPSULE ORAL ONCE
Status: COMPLETED | OUTPATIENT
Start: 2020-12-16 | End: 2020-12-16

## 2020-12-16 RX ORDER — SODIUM CHLORIDE, SODIUM LACTATE, POTASSIUM CHLORIDE, CALCIUM CHLORIDE 600; 310; 30; 20 MG/100ML; MG/100ML; MG/100ML; MG/100ML
9 INJECTION, SOLUTION INTRAVENOUS CONTINUOUS
Status: DISCONTINUED | OUTPATIENT
Start: 2020-12-16 | End: 2020-12-16 | Stop reason: HOSPADM

## 2020-12-16 RX ORDER — PROMETHAZINE HYDROCHLORIDE 25 MG/1
25 TABLET ORAL ONCE AS NEEDED
Status: DISCONTINUED | OUTPATIENT
Start: 2020-12-16 | End: 2020-12-16 | Stop reason: HOSPADM

## 2020-12-16 RX ORDER — ROCURONIUM BROMIDE 10 MG/ML
INJECTION, SOLUTION INTRAVENOUS AS NEEDED
Status: DISCONTINUED | OUTPATIENT
Start: 2020-12-16 | End: 2020-12-16 | Stop reason: SURG

## 2020-12-16 RX ORDER — MELOXICAM 15 MG/1
15 TABLET ORAL ONCE
Status: COMPLETED | OUTPATIENT
Start: 2020-12-16 | End: 2020-12-16

## 2020-12-16 RX ORDER — NALOXONE HCL 0.4 MG/ML
0.2 VIAL (ML) INJECTION AS NEEDED
Status: DISCONTINUED | OUTPATIENT
Start: 2020-12-16 | End: 2020-12-16 | Stop reason: HOSPADM

## 2020-12-16 RX ORDER — PROPOFOL 10 MG/ML
VIAL (ML) INTRAVENOUS AS NEEDED
Status: DISCONTINUED | OUTPATIENT
Start: 2020-12-16 | End: 2020-12-16 | Stop reason: SURG

## 2020-12-16 RX ORDER — DEXAMETHASONE SODIUM PHOSPHATE 10 MG/ML
INJECTION INTRAMUSCULAR; INTRAVENOUS AS NEEDED
Status: DISCONTINUED | OUTPATIENT
Start: 2020-12-16 | End: 2020-12-16 | Stop reason: SURG

## 2020-12-16 RX ORDER — HYDROMORPHONE HYDROCHLORIDE 1 MG/ML
0.5 INJECTION, SOLUTION INTRAMUSCULAR; INTRAVENOUS; SUBCUTANEOUS
Status: DISCONTINUED | OUTPATIENT
Start: 2020-12-16 | End: 2020-12-16 | Stop reason: HOSPADM

## 2020-12-16 RX ORDER — HYDROMORPHONE HYDROCHLORIDE 2 MG/1
2 TABLET ORAL EVERY 4 HOURS PRN
Status: DISCONTINUED | OUTPATIENT
Start: 2020-12-16 | End: 2020-12-17 | Stop reason: HOSPADM

## 2020-12-16 RX ORDER — ACETAMINOPHEN 10 MG/ML
1000 INJECTION, SOLUTION INTRAVENOUS ONCE
Status: COMPLETED | OUTPATIENT
Start: 2020-12-16 | End: 2020-12-16

## 2020-12-16 RX ORDER — FENTANYL CITRATE 50 UG/ML
INJECTION, SOLUTION INTRAMUSCULAR; INTRAVENOUS AS NEEDED
Status: DISCONTINUED | OUTPATIENT
Start: 2020-12-16 | End: 2020-12-16 | Stop reason: SURG

## 2020-12-16 RX ORDER — CEFAZOLIN SODIUM 2 G/100ML
2 INJECTION, SOLUTION INTRAVENOUS ONCE
Status: COMPLETED | OUTPATIENT
Start: 2020-12-16 | End: 2020-12-16

## 2020-12-16 RX ORDER — VANCOMYCIN HYDROCHLORIDE 1 G/200ML
15 INJECTION, SOLUTION INTRAVENOUS ONCE
Status: COMPLETED | OUTPATIENT
Start: 2020-12-16 | End: 2020-12-16

## 2020-12-16 RX ORDER — SODIUM CHLORIDE 0.9 % (FLUSH) 0.9 %
3-10 SYRINGE (ML) INJECTION AS NEEDED
Status: DISCONTINUED | OUTPATIENT
Start: 2020-12-16 | End: 2020-12-16 | Stop reason: HOSPADM

## 2020-12-16 RX ORDER — ACETAMINOPHEN 325 MG/1
325 TABLET ORAL EVERY 4 HOURS PRN
Status: DISCONTINUED | OUTPATIENT
Start: 2020-12-16 | End: 2020-12-17 | Stop reason: HOSPADM

## 2020-12-16 RX ORDER — ONDANSETRON 2 MG/ML
INJECTION INTRAMUSCULAR; INTRAVENOUS AS NEEDED
Status: DISCONTINUED | OUTPATIENT
Start: 2020-12-16 | End: 2020-12-16 | Stop reason: SURG

## 2020-12-16 RX ORDER — DIPHENHYDRAMINE HCL 25 MG
25 CAPSULE ORAL
Status: DISCONTINUED | OUTPATIENT
Start: 2020-12-16 | End: 2020-12-16 | Stop reason: HOSPADM

## 2020-12-16 RX ORDER — ONDANSETRON 2 MG/ML
4 INJECTION INTRAMUSCULAR; INTRAVENOUS EVERY 6 HOURS PRN
Status: DISCONTINUED | OUTPATIENT
Start: 2020-12-16 | End: 2020-12-17 | Stop reason: HOSPADM

## 2020-12-16 RX ORDER — SCOLOPAMINE TRANSDERMAL SYSTEM 1 MG/1
1 PATCH, EXTENDED RELEASE TRANSDERMAL ONCE
Status: DISCONTINUED | OUTPATIENT
Start: 2020-12-16 | End: 2020-12-16

## 2020-12-16 RX ORDER — MIDAZOLAM HYDROCHLORIDE 1 MG/ML
1 INJECTION INTRAMUSCULAR; INTRAVENOUS
Status: DISCONTINUED | OUTPATIENT
Start: 2020-12-16 | End: 2020-12-16 | Stop reason: HOSPADM

## 2020-12-16 RX ORDER — LIDOCAINE HYDROCHLORIDE 20 MG/ML
INJECTION, SOLUTION INFILTRATION; PERINEURAL AS NEEDED
Status: DISCONTINUED | OUTPATIENT
Start: 2020-12-16 | End: 2020-12-16 | Stop reason: SURG

## 2020-12-16 RX ORDER — PANTOPRAZOLE SODIUM 40 MG/1
40 TABLET, DELAYED RELEASE ORAL
Status: DISCONTINUED | OUTPATIENT
Start: 2020-12-17 | End: 2020-12-17 | Stop reason: HOSPADM

## 2020-12-16 RX ORDER — EPHEDRINE SULFATE 50 MG/ML
5 INJECTION, SOLUTION INTRAVENOUS ONCE AS NEEDED
Status: DISCONTINUED | OUTPATIENT
Start: 2020-12-16 | End: 2020-12-16 | Stop reason: HOSPADM

## 2020-12-16 RX ORDER — DIPHENHYDRAMINE HYDROCHLORIDE 50 MG/ML
12.5 INJECTION INTRAMUSCULAR; INTRAVENOUS
Status: DISCONTINUED | OUTPATIENT
Start: 2020-12-16 | End: 2020-12-16 | Stop reason: HOSPADM

## 2020-12-16 RX ORDER — KETOROLAC TROMETHAMINE 30 MG/ML
15 INJECTION, SOLUTION INTRAMUSCULAR; INTRAVENOUS EVERY 8 HOURS
Status: DISCONTINUED | OUTPATIENT
Start: 2020-12-16 | End: 2020-12-17 | Stop reason: HOSPADM

## 2020-12-16 RX ORDER — POLYETHYLENE GLYCOL 3350 17 G/17G
17 POWDER, FOR SOLUTION ORAL 2 TIMES DAILY
Status: DISCONTINUED | OUTPATIENT
Start: 2020-12-16 | End: 2020-12-17 | Stop reason: HOSPADM

## 2020-12-16 RX ORDER — FAMOTIDINE 10 MG/ML
20 INJECTION, SOLUTION INTRAVENOUS ONCE
Status: COMPLETED | OUTPATIENT
Start: 2020-12-16 | End: 2020-12-16

## 2020-12-16 RX ORDER — TRANEXAMIC ACID 100 MG/ML
INJECTION, SOLUTION INTRAVENOUS AS NEEDED
Status: DISCONTINUED | OUTPATIENT
Start: 2020-12-16 | End: 2020-12-16 | Stop reason: SURG

## 2020-12-16 RX ORDER — HYDROCODONE BITARTRATE AND ACETAMINOPHEN 7.5; 325 MG/1; MG/1
1 TABLET ORAL ONCE AS NEEDED
Status: DISCONTINUED | OUTPATIENT
Start: 2020-12-16 | End: 2020-12-16 | Stop reason: HOSPADM

## 2020-12-16 RX ORDER — SODIUM CHLORIDE 0.9 % (FLUSH) 0.9 %
3 SYRINGE (ML) INJECTION EVERY 12 HOURS SCHEDULED
Status: DISCONTINUED | OUTPATIENT
Start: 2020-12-16 | End: 2020-12-16 | Stop reason: HOSPADM

## 2020-12-16 RX ORDER — LABETALOL HYDROCHLORIDE 5 MG/ML
5 INJECTION, SOLUTION INTRAVENOUS
Status: DISCONTINUED | OUTPATIENT
Start: 2020-12-16 | End: 2020-12-16 | Stop reason: HOSPADM

## 2020-12-16 RX ORDER — OXYCODONE AND ACETAMINOPHEN 7.5; 325 MG/1; MG/1
1 TABLET ORAL ONCE AS NEEDED
Status: DISCONTINUED | OUTPATIENT
Start: 2020-12-16 | End: 2020-12-16 | Stop reason: HOSPADM

## 2020-12-16 RX ORDER — MELOXICAM 15 MG/1
15 TABLET ORAL DAILY
Status: DISCONTINUED | OUTPATIENT
Start: 2020-12-17 | End: 2020-12-17 | Stop reason: HOSPADM

## 2020-12-16 RX ORDER — HYDROMORPHONE HYDROCHLORIDE 2 MG/1
1 TABLET ORAL EVERY 4 HOURS PRN
Status: DISCONTINUED | OUTPATIENT
Start: 2020-12-16 | End: 2020-12-17 | Stop reason: HOSPADM

## 2020-12-16 RX ORDER — PROMETHAZINE HYDROCHLORIDE 25 MG/1
25 SUPPOSITORY RECTAL ONCE AS NEEDED
Status: DISCONTINUED | OUTPATIENT
Start: 2020-12-16 | End: 2020-12-16 | Stop reason: HOSPADM

## 2020-12-16 RX ORDER — ONDANSETRON 2 MG/ML
4 INJECTION INTRAMUSCULAR; INTRAVENOUS ONCE AS NEEDED
Status: DISCONTINUED | OUTPATIENT
Start: 2020-12-16 | End: 2020-12-16 | Stop reason: HOSPADM

## 2020-12-16 RX ORDER — SODIUM CHLORIDE, SODIUM LACTATE, POTASSIUM CHLORIDE, CALCIUM CHLORIDE 600; 310; 30; 20 MG/100ML; MG/100ML; MG/100ML; MG/100ML
INJECTION, SOLUTION INTRAVENOUS CONTINUOUS PRN
Status: DISCONTINUED | OUTPATIENT
Start: 2020-12-16 | End: 2020-12-16 | Stop reason: SURG

## 2020-12-16 RX ORDER — LIDOCAINE HYDROCHLORIDE 10 MG/ML
0.5 INJECTION, SOLUTION EPIDURAL; INFILTRATION; INTRACAUDAL; PERINEURAL ONCE AS NEEDED
Status: DISCONTINUED | OUTPATIENT
Start: 2020-12-16 | End: 2020-12-16 | Stop reason: HOSPADM

## 2020-12-16 RX ORDER — MAGNESIUM HYDROXIDE 1200 MG/15ML
LIQUID ORAL AS NEEDED
Status: DISCONTINUED | OUTPATIENT
Start: 2020-12-16 | End: 2020-12-16 | Stop reason: HOSPADM

## 2020-12-16 RX ORDER — ONDANSETRON 4 MG/1
4 TABLET, FILM COATED ORAL EVERY 6 HOURS PRN
Status: DISCONTINUED | OUTPATIENT
Start: 2020-12-16 | End: 2020-12-17 | Stop reason: HOSPADM

## 2020-12-16 RX ORDER — FLUMAZENIL 0.1 MG/ML
0.2 INJECTION INTRAVENOUS AS NEEDED
Status: DISCONTINUED | OUTPATIENT
Start: 2020-12-16 | End: 2020-12-16 | Stop reason: HOSPADM

## 2020-12-16 RX ADMIN — MUPIROCIN 1 APPLICATION: 20 OINTMENT TOPICAL at 21:26

## 2020-12-16 RX ADMIN — ONDANSETRON HYDROCHLORIDE 4 MG: 2 SOLUTION INTRAMUSCULAR; INTRAVENOUS at 17:21

## 2020-12-16 RX ADMIN — FENTANYL CITRATE 50 MCG: 50 INJECTION INTRAMUSCULAR; INTRAVENOUS at 15:59

## 2020-12-16 RX ADMIN — ACETAMINOPHEN 950 MG: 10 INJECTION, SOLUTION INTRAVENOUS at 16:26

## 2020-12-16 RX ADMIN — ROCURONIUM BROMIDE 10 MG: 10 INJECTION INTRAVENOUS at 16:33

## 2020-12-16 RX ADMIN — SUGAMMADEX 200 MG: 100 INJECTION, SOLUTION INTRAVENOUS at 17:13

## 2020-12-16 RX ADMIN — PROPOFOL 100 MCG/KG/MIN: 10 INJECTION, EMULSION INTRAVENOUS at 15:58

## 2020-12-16 RX ADMIN — ROCURONIUM BROMIDE 40 MG: 10 INJECTION INTRAVENOUS at 15:59

## 2020-12-16 RX ADMIN — TRANEXAMIC ACID 1000 MG: 100 INJECTION, SOLUTION INTRAVENOUS at 17:04

## 2020-12-16 RX ADMIN — KETOROLAC TROMETHAMINE 15 MG: 30 INJECTION, SOLUTION INTRAMUSCULAR at 18:30

## 2020-12-16 RX ADMIN — PHENYLEPHRINE HYDROCHLORIDE 100 MCG: 10 INJECTION INTRAVENOUS at 16:59

## 2020-12-16 RX ADMIN — DEXAMETHASONE SODIUM PHOSPHATE 8 MG: 10 INJECTION INTRAMUSCULAR; INTRAVENOUS at 16:03

## 2020-12-16 RX ADMIN — GLYCOPYRROLATE 0.2 MG: 0.2 INJECTION INTRAMUSCULAR; INTRAVENOUS at 17:21

## 2020-12-16 RX ADMIN — PROPOFOL 100 MG: 10 INJECTION, EMULSION INTRAVENOUS at 15:58

## 2020-12-16 RX ADMIN — LIDOCAINE HYDROCHLORIDE 80 MG: 20 INJECTION, SOLUTION INFILTRATION; PERINEURAL at 15:59

## 2020-12-16 RX ADMIN — SODIUM CHLORIDE, POTASSIUM CHLORIDE, SODIUM LACTATE AND CALCIUM CHLORIDE: 600; 310; 30; 20 INJECTION, SOLUTION INTRAVENOUS at 15:54

## 2020-12-16 RX ADMIN — Medication 0.5 MCG/KG/HR: at 15:59

## 2020-12-16 RX ADMIN — VANCOMYCIN HYDROCHLORIDE 1000 MG: 1 INJECTION, SOLUTION INTRAVENOUS at 11:59

## 2020-12-16 RX ADMIN — PREGABALIN 150 MG: 75 CAPSULE ORAL at 12:19

## 2020-12-16 RX ADMIN — POLYETHYLENE GLYCOL 3350 17 G: 17 POWDER, FOR SOLUTION ORAL at 21:28

## 2020-12-16 RX ADMIN — SCOPALAMINE 1 PATCH: 1 PATCH, EXTENDED RELEASE TRANSDERMAL at 12:19

## 2020-12-16 RX ADMIN — MELOXICAM 15 MG: 15 TABLET ORAL at 13:45

## 2020-12-16 RX ADMIN — FAMOTIDINE 20 MG: 10 INJECTION INTRAVENOUS at 12:19

## 2020-12-16 RX ADMIN — TRANEXAMIC ACID 1000 MG: 100 INJECTION, SOLUTION INTRAVENOUS at 16:19

## 2020-12-16 RX ADMIN — SODIUM CHLORIDE, POTASSIUM CHLORIDE, SODIUM LACTATE AND CALCIUM CHLORIDE 9 ML/HR: 600; 310; 30; 20 INJECTION, SOLUTION INTRAVENOUS at 11:59

## 2020-12-16 RX ADMIN — CEFAZOLIN SODIUM 2 G: 2 INJECTION, SOLUTION INTRAVENOUS at 16:08

## 2020-12-16 RX ADMIN — PHENYLEPHRINE HYDROCHLORIDE 100 MCG: 10 INJECTION INTRAVENOUS at 17:21

## 2020-12-16 NOTE — ANESTHESIA PROCEDURE NOTES
Airway  Urgency: elective    Date/Time: 12/16/2020 4:00 PM  Airway not difficult    General Information and Staff    Patient location during procedure: OR  Anesthesiologist: Alaina Hazel MD    Indications and Patient Condition  Indications for airway management: airway protection    Preoxygenated: yes  MILS maintained throughout  Mask difficulty assessment: 1 - vent by mask    Final Airway Details  Final airway type: endotracheal airway      Successful airway: ETT  Cuffed: yes   Successful intubation technique: direct laryngoscopy  Blade: Darshana  Blade size: 3  ETT size (mm): 7.0  Cormack-Lehane Classification: grade I - full view of glottis  Placement verified by: chest auscultation and capnometry   Measured from: teeth  Number of attempts at approach: 1  Assessment: lips, teeth, and gum same as pre-op and atraumatic intubation

## 2020-12-16 NOTE — ANESTHESIA PREPROCEDURE EVALUATION
Anesthesia Evaluation     Patient summary reviewed and Nursing notes reviewed   history of anesthetic complications: PONV               Airway   Mallampati: II  TM distance: >3 FB  Neck ROM: full  Dental      Pulmonary - negative pulmonary ROS   Cardiovascular - negative cardio ROS    ECG reviewed  Rhythm: regular  Rate: normal        Neuro/Psych- negative ROS  GI/Hepatic/Renal/Endo - negative ROS     Musculoskeletal     Abdominal    Substance History - negative use     OB/GYN negative ob/gyn ROS         Other   arthritis,    history of cancer                  Anesthesia Plan    ASA 3     general   total IV anesthesia(Severe intractable post op nausea in the past    Dr Cobb prefers GETA for his hip procedures so a TIVA approach is a good option    Scope patch ordered because of extreme nausea history by patient's request    Patient concerned she will feel weak and SOB after extubation which has also happened before so recommend sugammadex on emergence)  intravenous induction     Anesthetic plan, all risks, benefits, and alternatives have been provided, discussed and informed consent has been obtained with: patient.

## 2020-12-16 NOTE — ANESTHESIA POSTPROCEDURE EVALUATION
"Patient: Maria Victoria Garcia    Procedure Summary     Date: 12/16/20 Room / Location: [facility] OR [address] [place] [address] MAIN OR    Anesthesia Start: 1554 Anesthesia Stop: 1743    Procedure: TOTAL HIP ARTHROPLASTY liner exchange (Right Hip) Diagnosis:       Status post right hip replacement      (Status post right hip replacement [Z96.641])    Surgeon: Fred Cobb MD Provider: Alaina Hazel MD    Anesthesia Type: general ASA Status: 3          Anesthesia Type: general    Vitals  Vitals Value Taken Time   /67 12/16/20 1830   Temp 36.4 °C (97.5 °F) 12/16/20 1740   Pulse 59 12/16/20 1832   Resp 16 12/16/20 1815   SpO2 99 % 12/16/20 1838   Vitals shown include unvalidated device data.        Post Anesthesia Care and Evaluation    Patient location during evaluation: bedside  Patient participation: complete - patient participated  Level of consciousness: awake  Pain management: adequate  Airway patency: patent  Anesthetic complications: No anesthetic complications    Cardiovascular status: acceptable  Respiratory status: acceptable  Hydration status: acceptable    Comments: */70   Pulse 65   Temp 36.4 °C (97.5 °F) (Oral)   Resp 16   Ht 154.9 cm (61\")   Wt 63.3 kg (139 lb 9 oz)   SpO2 99%   BMI 26.37 kg/m²         "

## 2020-12-16 NOTE — PERIOPERATIVE NURSING NOTE
Pt's son Darshan notified pt headed back to OR and MD will call upon completion of procedure with an update.

## 2020-12-16 NOTE — OP NOTE
Name: Maria Victoria Garcia  YOB: 1945    DATE OF SURGERY: 12/16/2020    PREOPERATIVE DIAGNOSIS: Right total hip poly wear    POSTOPERATIVE DIAGNOSIS: Right total hip poly wear    PROCEDURE PERFORMED: Right total hip polyethylene exchange    SURGEON: Fred Cobb M.D.    ASSISTANT: ROSALBA PEDROZA    IMPLANTS:  Implant Name Type Inv. Item Serial No.  Lot No. LRB No. Used Action   SUT CONTRL TISS STRATAFIX SYMM PDS PLUS KADEN CT-1 60CM - [id number] Implant SUT CONTRL TISS STRATAFIX SYMM PDS PLUS KADEN CT-1 60CM  [employer]  DIV OF J [employer] J [id number] Right 1 Implanted   LINER ACET DURLC MAR F/C 10D 32X52 - [id number] Implant LINER ACET DURLC MAR F/C 10D 32X52  DEPUY [id number] Right 1 Implanted   TOTAL HIP BALL FEMORAL HEAD    DEPUY [id number] Right 1 Implanted   SUT CONTRL TISS STRATAFIX SPIRAL MNCRYL UD 3/0 PLS 30CM - [id number] Implant SUT CONTRL TISS STRATAFIX SPIRAL MNCRYL UD 3/0 PLS 30CM  [employer]  DIV OF J AND J [id number] Right 1 Implanted       Estimated Blood Loss: 100cc  Specimens : none  Complications: none    DESCRIPTION OF PROCEDURE:    The patient was taken to the operating room and placed in the supine position. A sequential compression device was carefully placed on the non-operative leg. Preoperative antibiotics were administered. Surgical time out was performed. After adequate induction of anesthesia the patient was then transferred onto the  table and positioned appropriately in the lateral decubitus position. The hip was then prepped and draped in the usual sterile fashion.    A posterior lateral surgical incision was then made through the previous scar. The gluteus christa fascia was then divided the gluteus christa muscle was then bluntly dissected.  A Charnley self-retaining retractor was then placed.  A posterior capsulotomy was then performed.  The superior limb was divided in line with the piriformis tendon.  The hip was then dislocated.  The modular femoral head was then  removed and the trunion was subluxed anteriorly and head in that position with a reji retractor which was placed on the anterior-superior acetabulum. The scar tissue and inflammed synovium along with the synovial wear debris was then excised with a combination of electrocautery, sharp dissection, and curettage.  I made careful to try and maintain the superior capsule and we perform minimal dissection of the inferior and posterior capsule.  The worn iner was then removed with the poly removal device. The cup was carefully examined and noted to be well fixed. The cup was copiously irrigated, the locking ring was intact and the new poly liner was placed. It locked in nicely. A trial head was then placed, the hip was taken through a full range of motion and noted to be stable. Leg lengths were measured and felt to be equal. We then removed the trial components, copiously irrigated the hip, and chose the final head. The head was placed on a clean dry taper.  The leg lengths were again measured to be equal.  At this point the hip was copiously irrigated with pulse lavage and the capsule was then reapproximated with #1 PDS suture, and the remainder of the hip was closed in multiple layers in standard fashion..  There was excellent hemostasis. We placed a one-eighth inch Hemovac drain.  Sterile dressing were applied. At the end of the case, the sponge and needle counts were reported as being correct. There were no known complications. The patient was then transported to the recovery room.      Fred Cobb M.D.  12/16/2020

## 2020-12-16 NOTE — ANESTHESIA POSTPROCEDURE EVALUATION
Patient: Maria Victoria Garcia    Procedure Summary     Date: 12/16/20 Room / Location: [facility] OR [address] [place] [address] MAIN OR    Anesthesia Start: 1554 Anesthesia Stop:     Procedure: TOTAL HIP ARTHROPLASTY liner exchange (Right Hip) Diagnosis:       Status post right hip replacement      (Status post right hip replacement [Z96.641])    Surgeon: Fred Cobb MD Provider: Alaina Hazel MD    Anesthesia Type: general ASA Status: 3          Anesthesia Type: general    Vitals  Vitals Value Taken Time   /63 12/16/20 1741   Temp     Pulse 74 12/16/20 1745   Resp     SpO2 99 % 12/16/20 1745   Vitals shown include unvalidated device data.        Anesthesia Post Evaluation

## 2020-12-17 ENCOUNTER — TELEPHONE (OUTPATIENT)
Dept: ORTHOPEDIC SURGERY | Facility: CLINIC | Age: 75
End: 2020-12-17

## 2020-12-17 VITALS
WEIGHT: 139.56 LBS | OXYGEN SATURATION: 92 % | HEIGHT: 61 IN | TEMPERATURE: 98.1 F | RESPIRATION RATE: 18 BRPM | SYSTOLIC BLOOD PRESSURE: 104 MMHG | HEART RATE: 78 BPM | DIASTOLIC BLOOD PRESSURE: 52 MMHG | BODY MASS INDEX: 26.35 KG/M2

## 2020-12-17 LAB
HCT VFR BLD AUTO: 35.7 % (ref 34–46.6)
HGB BLD-MCNC: 12.1 G/DL (ref 12–15.9)

## 2020-12-17 PROCEDURE — 85014 HEMATOCRIT: CPT | Performed by: NURSE PRACTITIONER

## 2020-12-17 PROCEDURE — 99024 POSTOP FOLLOW-UP VISIT: CPT | Performed by: NURSE PRACTITIONER

## 2020-12-17 PROCEDURE — 25010000002 KETOROLAC TROMETHAMINE PER 15 MG: Performed by: NURSE PRACTITIONER

## 2020-12-17 PROCEDURE — 85018 HEMOGLOBIN: CPT | Performed by: NURSE PRACTITIONER

## 2020-12-17 PROCEDURE — 25010000003 CEFAZOLIN IN DEXTROSE 2-4 GM/100ML-% SOLUTION: Performed by: NURSE PRACTITIONER

## 2020-12-17 PROCEDURE — 97161 PT EVAL LOW COMPLEX 20 MIN: CPT

## 2020-12-17 PROCEDURE — 97110 THERAPEUTIC EXERCISES: CPT

## 2020-12-17 RX ORDER — ONDANSETRON 4 MG/1
4 TABLET, FILM COATED ORAL EVERY 6 HOURS PRN
Qty: 10 TABLET | Refills: 0 | Status: SHIPPED | OUTPATIENT
Start: 2020-12-17 | End: 2021-12-16

## 2020-12-17 RX ORDER — PANTOPRAZOLE SODIUM 40 MG/1
40 TABLET, DELAYED RELEASE ORAL DAILY
Qty: 14 TABLET | Refills: 0 | Status: SHIPPED | OUTPATIENT
Start: 2020-12-17 | End: 2021-12-16

## 2020-12-17 RX ORDER — HYDROMORPHONE HYDROCHLORIDE 2 MG/1
2 TABLET ORAL EVERY 4 HOURS PRN
Qty: 30 TABLET | Refills: 0 | Status: SHIPPED | OUTPATIENT
Start: 2020-12-17 | End: 2021-01-16

## 2020-12-17 RX ORDER — POLYETHYLENE GLYCOL 3350 17 G/17G
17 POWDER, FOR SOLUTION ORAL 2 TIMES DAILY
Qty: 510 G | Refills: 0 | Status: SHIPPED | OUTPATIENT
Start: 2020-12-17 | End: 2020-12-31

## 2020-12-17 RX ORDER — ASPIRIN 81 MG/1
81 TABLET ORAL EVERY 12 HOURS SCHEDULED
Qty: 60 TABLET | Refills: 0 | Status: SHIPPED | OUTPATIENT
Start: 2020-12-17 | End: 2021-01-16

## 2020-12-17 RX ADMIN — PANTOPRAZOLE SODIUM 40 MG: 40 TABLET, DELAYED RELEASE ORAL at 06:35

## 2020-12-17 RX ADMIN — MUPIROCIN 1 APPLICATION: 20 OINTMENT TOPICAL at 08:42

## 2020-12-17 RX ADMIN — CEFAZOLIN SODIUM 2 G: 2 INJECTION, SOLUTION INTRAVENOUS at 08:34

## 2020-12-17 RX ADMIN — MELOXICAM 15 MG: 15 TABLET ORAL at 08:42

## 2020-12-17 RX ADMIN — ASPIRIN 81 MG: 81 TABLET, COATED ORAL at 08:34

## 2020-12-17 RX ADMIN — CEFAZOLIN SODIUM 2 G: 2 INJECTION, SOLUTION INTRAVENOUS at 00:06

## 2020-12-17 RX ADMIN — KETOROLAC TROMETHAMINE 15 MG: 30 INJECTION, SOLUTION INTRAMUSCULAR at 09:55

## 2020-12-17 RX ADMIN — KETOROLAC TROMETHAMINE 15 MG: 30 INJECTION, SOLUTION INTRAMUSCULAR at 02:25

## 2020-12-17 NOTE — PROGRESS NOTES
Continued Stay Note  [facility]     Patient Name: Maria Victoria Garcia  MRN: [id number]  Today's Date: 12/17/2020    Admit Date: 12/16/2020    Discharge Plan     Row Name 12/17/20 0858       Plan    Plan  Home with [facility] (referral in EPIC)    Plan Comments  Discharged order ntoed. Met with patient who confimred DC plan is home. Discussed order for HH. Patient agreeable to [facility]. Referral placed in EPIC. Spoke to Tara with [facility].        Discharge Codes    No documentation.       Expected Discharge Date and Time     Expected Discharge Date Expected Discharge Time    Dec 17, 2020             Lluvia Marcos RN

## 2020-12-17 NOTE — DISCHARGE PLACEMENT REQUEST
"Maria Victoria Garcia (75 y.o. Female)     Date of Birth Social Security Number Address Home Phone MRN    1945  [address] [place] [address] [phone] [id number]    Oriental orthodox Marital Status          Holiness        Admission Date Admission Type Admitting Provider Attending Provider Department, Room/Bed    12/16/20 Elective Fred Cobb MD Brown, Reid B, MD [address], 90/[address]    Discharge Date Discharge Disposition Discharge Destination         [place] [facility]ending Provider: Fred Cobb MD    Allergies: Codeine, Hydrocodone    Isolation: None   Infection: None   Code Status: CPR    Ht: 154.9 cm (61\")   Wt: 63.3 kg (139 lb 9 oz)    Admission Cmt: None   Principal Problem: Status post right hip replacement [Z96.641] More...                 Active Insurance as of 12/16/2020     Primary Coverage     Payor Plan Insurance Group Employer/Plan Group    HUMANA MEDICARE REPLACEMENT HU[employer]NA MEDICARE REPLACEMENT [id number]     Payor Plan Address Payor Plan Phone Number Payor Plan Fax Number Effective Dates    [address] [address] [phone]  1/1/2018 - None Entered    [facility] [id number]       Subscriber Name Subscriber Birth Date Member ID       MARIA VICTORIA GARCIA 1945 [id number]                 Emergency Contacts      (Rel.) Home Phone Work Phone Mobile Phone    Darshan Addison (Son) [phone] -- [phone]              "
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
## 2020-12-17 NOTE — TELEPHONE ENCOUNTER
Pt son called asking if you could give him a call sometime today, it was late when sx was done last night and  just wanted to know what all was done.      [phone]

## 2020-12-17 NOTE — DISCHARGE SUMMARY
Patient Name: Maria Victoria Garcia  Patient YOB: 1945    Date of Admission:  12/16/2020  Date of Discharge:  12/17/2020  Discharge Diagnosis: CA TOTAL HIP ARTHROPLASTY [02023] (TOTAL HIP ARTHROPLASTY liner exchange possible acetabular revision)  Presenting Problem/History of Present Illness: Status post right hip replacement [Z96.641]  OA (osteoarthritis) of hip [M16.9]  Admitting Physician: Dr Fred Cobb  Consults:   Consults     No orders found for last 30 day(s).          DETAILS OF HOSPITAL STAY:  Patient is a 75 y.o. female was admitted to the floor following the above procedure and underwent an uncomplicated hospital stay.  Patient did well with physical therapy and was ambulating well without problems.  On the day of discharge the wound was clean, dry and intact and calf was soft and non tender and Homans sign was negative.  Patient was tolerating  without problems.  Patient will be discharged home.    Condition on Discharge:  Stable    Vital Signs  Temp:  [96.8 °F (36 °C)-98.9 °F (37.2 °C)] 97.2 °F (36.2 °C)  Heart Rate:  [] 56  Resp:  [16-20] 18  BP: (106-183)/(57-89) 106/57    LABS:   Admission on 12/16/2020   Component Date Value Ref Range Status   • ABO Type 12/16/2020 A   Final   • RH type 12/16/2020 Positive   Final   • Antibody Screen 12/16/2020 Negative   Final   • T&S Expiration Date 12/16/2020 12/19/2020 11:59:59 PM   Final   • Glucose 12/16/2020 93  65 - 99 mg/dL Final   • BUN 12/16/2020 11  8 - 23 mg/dL Final   • Creatinine 12/16/2020 0.78  0.57 - 1.00 mg/dL Final   • Sodium 12/16/2020 139  136 - 145 mmol/L Final   • Potassium 12/16/2020 3.9  3.5 - 5.2 mmol/L Final   • Chloride 12/16/2020 103  98 - 107 mmol/L Final   • CO2 12/16/2020 26.0  22.0 - 29.0 mmol/L Final   • Calcium 12/16/2020 8.9  8.6 - 10.5 mg/dL Final   • eGFR Non African Amer 12/16/2020 72  >60 mL/min/1.73 Final   • BUN/Creatinine Ratio 12/16/2020 14.1  7.0 - 25.0 Final   • Anion Gap 12/16/2020 10.0  5.0 - 15.0  mmol/L Final       No results found.        Discharge Medications     Discharge Medications      New Medications      Instructions Start Date   aspirin 81 MG EC tablet   81 mg, Oral, Every 12 Hours Scheduled      ondansetron 4 MG tablet  Commonly known as: ZOFRAN   4 mg, Oral, Every 6 Hours PRN      pantoprazole 40 MG EC tablet  Commonly known as: PROTONIX   40 mg, Oral, Daily      polyethylene glycol 17 g packet  Commonly known as: MIRALAX   17 g, Oral, 2 Times Daily         Stop These Medications    Chlorhexidine Gluconate 2 % pads     mupirocin 2 % nasal ointment  Commonly known as: BACTROBAN            Activity at Discharge:     Discharge Instructions:   1)  Patient is to continue with physical therapy exercises daily and continue working with the physical therapist as ordered.  2)  Follow Posterior hip precautions.   3)  Patient may weight bear as tolerated.   4)  Apply ice regularly. You may ice for long periods of time as long as ice is not directly on the skin. Patient instructed on frequent calf pumping exercises.  Patient also instructed on incentive spirometer during hospitalization and encouraged to continue to use at home regularly.   5)  The dressing should be left in place. If waterproof dressing is intact the patient may shower immediately following discharge. If the dressing becomes disloged or saturated it should be changed. Please refer to the LIAN information sheet if you have any questions about the dressing.  If you have a home health nurse or therapist they can be contacted to assist with dressing change or repair. You may also call the [facility] dressing hotline for questions related to the dressing ([phone]). If there still other problems or questions related to the dressing despite these measures then you can contact Lauren at our office [phone].   6)  Follow up appointment in 2 weeks - patient to call the office at [phone] to schedule. 7)  Patient will be discharged on aspirin 81mg  BID x weeks, then daily x 4weeks    Complete Discharge Diagnosis:    Patient Active Problem List   Diagnosis   • OA (osteoarthritis) of hip   • Status post right hip replacement           Follow-up Appointments  No future appointments.  Additional Instructions for the Follow-ups that You Need to Schedule     Ambulatory Referral to Home Health   As directed      Face to Face Visit Date: 12/17/2020    Follow-up provider for Plan of Care?: I will be treating the patient on an ongoing basis.  Please send me the Plan of Care for signature.    Follow-up provider: ARNIE MON [[id number]]    Reason/Clinical Findings: lulu    Describe mobility limitations that make leaving home difficult: postop    Nursing/Therapeutic Services Requested: Physical Therapy    PT orders: Total joint pathway    Frequency: 1 Week 1                    Jeannie Pool, JAYDE  12/17/20  08:03 EST

## 2020-12-17 NOTE — PLAN OF CARE
Problem: Adult Inpatient Plan of Care  Goal: Plan of Care Review  Outcome: Ongoing, Progressing  Flowsheets (Taken 12/17/2020 0614)  Progress: improving  Plan of Care Reviewed With: patient  Outcome Summary: slept well, adequate pain control on IV Toradol, dressings CDI, voiding per bedpan, IS up to 500 only, foot pumps done when awake, VSS, will start ambulating today   Goal Outcome Evaluation:  Plan of Care Reviewed With: patient  Progress: improving  Outcome Summary: slept well, adequate pain control on IV Toradol, dressings CDI, voiding per bedpan, IS up to 500 only, foot pumps done when awake, VSS, will start ambulating today

## 2020-12-18 NOTE — PROGRESS NOTES
Case Management Discharge Note      Final Note: Discharged home with [facility]. Lluvia Marcos RN             Home Medical Care     Service Provider Selected Services Address Phone Fax Patient Preferred    [facility] [place]  [facility] [address] [address] [phone] --         Transportation Services  Private: Car    Final Discharge Disposition Code: 06 - home with home health care

## 2020-12-22 ENCOUNTER — NURSE TRIAGE (OUTPATIENT)
Dept: CALL CENTER | Facility: HOSPITAL | Age: 75
End: 2020-12-22

## 2020-12-22 ENCOUNTER — TELEPHONE (OUTPATIENT)
Dept: ORTHOPEDIC SURGERY | Facility: CLINIC | Age: 75
End: 2020-12-22

## 2020-12-22 NOTE — TELEPHONE ENCOUNTER
Informed the jean carlos dressing usually stops at 7 days and when it does to cut the tubing with the box and tape excess tubing to the bandage.only change the dressing if it becomes saturated and to either use sterile/clean dressing to cover the incision with tape or use the pco dressing that was sent home after being discharged. Informed patient to call or send a [employer] message if she had any further questions or concerns.  Patient voiced understanding

## 2020-12-22 NOTE — TELEPHONE ENCOUNTER
PATIENT CALLED CONCERNING HER LIAN DRESSING. THERE WERE NO INSTRUCTIONS FOR CARE, SUCH AS IF SHE CAN CHANGE THE DRESSING, SHOWER, ETC. PRIOR TO HER NEXT VISIT WITH DR. MON.  SHE WOULD LIKE A CALL BACK TO DISCUSS.    SALINA ORTEGA MAY BE REACHED AT:  [phone]

## 2020-12-22 NOTE — TELEPHONE ENCOUNTER
"Reviewed AVS and chart. D/C instructions in chart state to leave LIAN dressing on unless it becomes saturated or loose, give LIAN help line and states to call Lauren at surgeon's office if there are any problems. This information given to caller.   Reason for Disposition  • Health Information question, no triage required and triager able to answer question    Additional Information  • Negative: [1] Caller is not with the adult (patient) AND [2] reporting urgent symptoms  • Negative: Lab result questions  • Negative: Medication questions  • Negative: Caller can't be reached by phone  • Negative: Caller has already spoken to PCP or another triager  • Negative: RN needs further essential information from caller in order to complete triage  • Negative: Requesting regular office appointment  • Negative: [1] Caller requesting NON-URGENT health information AND [2] PCP's office is the best resource    Answer Assessment - Initial Assessment Questions  1. REASON FOR CALL or QUESTION: \"What is your reason for calling today?\" or \"How can I best help you?\" or \"What question do you have that I can help answer?\"      Caller asking if she needs to change the dressing on her hip surgery site.    Protocols used: INFORMATION ONLY CALL - NO TRIAGE-ADULT-AH    "

## 2021-01-05 ENCOUNTER — OFFICE VISIT (OUTPATIENT)
Dept: ORTHOPEDIC SURGERY | Facility: CLINIC | Age: 76
End: 2021-01-05

## 2021-01-05 VITALS — WEIGHT: 139 LBS | BODY MASS INDEX: 26.24 KG/M2 | HEIGHT: 61 IN

## 2021-01-05 DIAGNOSIS — Z96.641 STATUS POST RIGHT HIP REPLACEMENT: Primary | ICD-10-CM

## 2021-01-05 PROCEDURE — 99024 POSTOP FOLLOW-UP VISIT: CPT | Performed by: ORTHOPAEDIC SURGERY

## 2021-01-05 PROCEDURE — 73501 X-RAY EXAM HIP UNI 1 VIEW: CPT | Performed by: ORTHOPAEDIC SURGERY

## 2021-01-05 RX ORDER — PHENAZOPYRIDINE HYDROCHLORIDE 200 MG/1
200 TABLET, FILM COATED ORAL 3 TIMES DAILY
COMMUNITY
Start: 2020-12-31 | End: 2021-12-16

## 2021-01-05 RX ORDER — CIPROFLOXACIN 250 MG/1
250 TABLET, FILM COATED ORAL 2 TIMES DAILY
COMMUNITY
Start: 2020-12-28 | End: 2021-12-16

## 2021-01-05 NOTE — PROGRESS NOTES
Maria Victoria Garcia : 1945 MRN: [id number] DATE: 2021    DIAGNOSIS: 2 week follow up right total hip poly chnage    SUBJECTIVE:Patient returns today for 2 week follow up of right total hip replacement. Patient reports doing well with no unusual complaints. Appears to be progressing appropriately.    OBJECTIVE:   Exam:. The incision is healing appropriately. No sign of infection. Range of motion is progressing as expected. The calf is soft and nontender with a negative Homans sign.    DIAGNOSTIC STUDIES  Xrays: 2 views of the right hip (AP pelvis and lateral right hip) were ordered and reviewed for evaluation of recent hip replacement. They demonstrate a well positioned, well aligned hip replacement without complicating factors noted. In comparison with previous films there has been interval implant placement.    ASSESSMENT: 2 week status post right hip replacement poly change.    PLAN: 1) Staples removed and steri strips applied   2) PT exercises   3) Discontinue EPIFANIO hose   4) Continue ice PRN   5) WBAT   6) aspirin 81 mg orally every day for 1 month   7) Follow up in 6 weeks with repeat Xrays of right hip (2views)    Fred Cobb MD  2021

## 2021-02-18 ENCOUNTER — OFFICE VISIT (OUTPATIENT)
Dept: ORTHOPEDIC SURGERY | Facility: CLINIC | Age: 76
End: 2021-02-18

## 2021-02-18 VITALS — WEIGHT: 138 LBS | HEIGHT: 61 IN | TEMPERATURE: 96.4 F | BODY MASS INDEX: 26.06 KG/M2

## 2021-02-18 DIAGNOSIS — Z96.641 STATUS POST RIGHT HIP REPLACEMENT: ICD-10-CM

## 2021-02-18 DIAGNOSIS — R52 PAIN: Primary | ICD-10-CM

## 2021-02-18 PROCEDURE — 99024 POSTOP FOLLOW-UP VISIT: CPT | Performed by: NURSE PRACTITIONER

## 2021-02-18 PROCEDURE — 73502 X-RAY EXAM HIP UNI 2-3 VIEWS: CPT | Performed by: NURSE PRACTITIONER

## 2021-02-18 NOTE — PROGRESS NOTES
Maria Victoria Garcia : 1945 MRN: [id number] DATE: 2021    DIAGNOSIS: 8 week follow up right total hip (Posterior)    SUBJECTIVE:Patient returns today for 8 week follow up of right total hip replacement. Patient reports doing well with no unusual complaints. Appears to be progressing appropriately and is using a cane only due to the weather and icy conditions outside.    OBJECTIVE:   Exam:. The incision is healed. No sign of infection. Range of motion is progressing as expected. The calf is soft and nontender with a negative Homans sign. Strength progressing    DIAGNOSTIC STUDIES  Xrays: 2 views of the right hip (AP pelvis and lateral right hip) were ordered and reviewed for evaluation of recent hip replacement. They demonstrate a well positioned, well aligned hip replacement without complicating factors noted. In comparison with previous films there has been interval implant placement.    ASSESSMENT: 8 week status post right hip replacement.    PLAN: 1) Activity as tolerated   2) Continue hip strengthening exercises    3) Follow up 1 year post-op with repeat Xrays of right hip (2views AP Pelvis      and lateral left hip)    JAYDE Zarate  2021

## 2021-03-16 ENCOUNTER — HOSPITAL ENCOUNTER (EMERGENCY)
Facility: HOSPITAL | Age: 76
Discharge: HOME | End: 2021-03-16
Attending: EMERGENCY MEDICINE | Admitting: EMERGENCY MEDICINE

## 2021-03-16 ENCOUNTER — TELEPHONE (OUTPATIENT)
Dept: ORTHOPEDIC SURGERY | Facility: CLINIC | Age: 76
End: 2021-03-16

## 2021-03-16 ENCOUNTER — APPOINTMENT (OUTPATIENT)
Dept: GENERAL RADIOLOGY | Facility: HOSPITAL | Age: 76
End: 2021-03-16

## 2021-03-16 VITALS
RESPIRATION RATE: 18 BRPM | BODY MASS INDEX: 25.49 KG/M2 | WEIGHT: 135 LBS | HEIGHT: 61 IN | DIASTOLIC BLOOD PRESSURE: 71 MMHG | OXYGEN SATURATION: 97 % | HEART RATE: 99 BPM | SYSTOLIC BLOOD PRESSURE: 176 MMHG | TEMPERATURE: 98.4 F

## 2021-03-16 DIAGNOSIS — S76.011A HIP STRAIN, RIGHT, INITIAL ENCOUNTER: Primary | ICD-10-CM

## 2021-03-16 DIAGNOSIS — Z96.641 STATUS POST TOTAL HIP REPLACEMENT, RIGHT: ICD-10-CM

## 2021-03-16 PROCEDURE — 99282 EMERGENCY DEPT VISIT SF MDM: CPT

## 2021-03-16 PROCEDURE — 73502 X-RAY EXAM HIP UNI 2-3 VIEWS: CPT

## 2021-03-16 RX ORDER — HYDROCODONE BITARTRATE AND ACETAMINOPHEN 5; 325 MG/1; MG/1
1 TABLET ORAL EVERY 6 HOURS PRN
Qty: 8 TABLET | Refills: 0 | Status: SHIPPED | OUTPATIENT
Start: 2021-03-16 | End: 2021-12-16

## 2021-03-16 RX ORDER — LIDOCAINE 50 MG/G
1 PATCH TOPICAL EVERY 24 HOURS
Qty: 10 PATCH | Refills: 0 | Status: SHIPPED | OUTPATIENT
Start: 2021-03-16

## 2021-03-16 NOTE — TELEPHONE ENCOUNTER
PATIENT CALLED AND SHE SIT ON THE TOILET ABOUT 30 MINS AGO AND WAS UNABLE TO STAND UP COMPLETELY. FELT A LOT OF PAIN. I INFORMED DR MON AND HE STATED SHE NEEDED TO GO TO THE ER TO MAKE SURE IT WAS NOT OUT OF PLACE. SHE AGREED TO GO TO [facility].

## 2021-03-16 NOTE — ED TRIAGE NOTES
Pt reports rt hip pain that started on standing from the toilet, rt hip replacement 3 months ago, concerned for dislocation

## 2021-03-16 NOTE — DISCHARGE INSTRUCTIONS
Lidoderm patches for pain  Use hydrocodone sparingly for pain as need  Home to rest.  Ice the area 20 minutes at a time 4 times a day.  Use walker or cane for ambulation.  Call Dr. Cobb's office and schedule appointment for follow-up.  Return for worsening symptoms or new concerns.

## 2021-03-16 NOTE — ED PROVIDER NOTES
EMERGENCY DEPARTMENT ENCOUNTER    Room Number:  44/44  Date of encounter:  3/17/2021  PCP: Emile Rasmussen MD  Historian: patient   Full history not obtainable due to: none     HPI:  Chief Complaint: Right hip pain     Context: Maria Victoria Garcia is a 75 y.o. female who presents to the ED c/o right hip pain after standing up this morning.  She states the pain has been constant and severe.  Nonradiating.  She is unable to bear weight since the episode as it greatly increases her pain.  She is status post this replacement by Dr. Cobb 3 months ago.  States she completed physical therapy and has done well.  She reports no falls or injuries this morning preceding the pain.  She states she walked her dog as she normally does each morning without difficulty.  She denies any additional symptoms including no nausea vomiting fever body aches or chills.  Denies any back pain or knee pain.      MEDICAL RECORD REVIEW: Reviewed discharge summaries by Jayde Pool dated 12/17/2020.  Patient presented for total hip arthroplasty.  Hospital stay and surgery was uncomplicated.  He was ultimately discharged home.      PAST MEDICAL HISTORY    Active Ambulatory Problems     Diagnosis Date Noted   • OA (osteoarthritis) of hip 03/17/2020   • Status post right hip replacement 05/29/2020     Resolved Ambulatory Problems     Diagnosis Date Noted   • No Resolved Ambulatory Problems     Past Medical History:   Diagnosis Date   • Cancer (CMS/HCC)    • History of gallbladder cancer    • History of transfusion    • PONV (postoperative nausea and vomiting)    • Right hip pain          PAST SURGICAL HISTORY  Past Surgical History:   Procedure Laterality Date   • APPENDECTOMY     • COLONOSCOPY     • GALLBLADDER SURGERY  08/1998   • HIP MINI REVISION Right 12/16/2020    Procedure: TOTAL HIP ARTHROPLASTY liner exchange;  Surgeon: Fred Cobb MD;  Location: [facility];  Service: Orthopedics;  Laterality: Right;   • HIP SURGERY Bilateral  1989    ALSO 1991; REPLACEMENT   • NASAL SEPTUM SURGERY     • ORIF ANKLE FRACTURE Left          FAMILY HISTORY  Family History   Problem Relation Age of Onset   • Lung cancer Other    • Pancreatic cancer Other    • Malig Hyperthermia Neg Hx          SOCIAL HISTORY  Social History     Socioeconomic History   • Marital status:      Spouse name: Not on file   • Number of children: Not on file   • Years of education: Not on file   • Highest education level: Not on file   Tobacco Use   • Smoking status: Never Smoker   • Smokeless tobacco: Never Used   Vaping Use   • Vaping Use: Never used   Substance and Sexual Activity   • Alcohol use: No   • Drug use: Never   • Sexual activity: Defer         ALLERGIES  Codeine and Hydrocodone        REVIEW OF SYSTEMS  Review of Systems   All systems reviewed and marked as negative except as listed in HPI       PHYSICAL EXAM    I have reviewed the triage vital signs and nursing notes.    ED Triage Vitals [03/16/21 1138]   Temp Heart Rate Resp BP SpO2   98.4 °F (36.9 °C) 99 18 -- 98 %      Temp src Heart Rate Source Patient Position BP Location FiO2 (%)   -- -- -- -- --       GENERAL: alert well developed, well nourished in no distress  HENT: NCAT, neck supple, trachea midline  EYES: no scleral icterus, PERRL, normal conjunctivae  CV: regular rhythm, regular rate, no murmur  RESPIRATORY: unlabored effort, CTAB  ABDOMEN: soft, nontender, nondistended, bowel sounds present  MUSCULOSKELETAL: no gross deformity.  No tenderness of the CT or L-spine.  There is tenderness over the right SI joint.  No tenderness of the right hip.  There is a healed surgical incision over the right hip which is otherwise unremarkable.  No tenderness of the thigh or knee.  Capillary refill of the right lower extremity is brisk.  Pedal pulses are palpable.  NEURO: alert,  sensory and motor function of extremities grossly intact, speech clear, mental status normal/baseline  SKIN: warm, dry, no rash  PSYCH:   Appropriate mood and affect    Vital signs and nursing notes reviewed.          RADIOLOGY  XR Hip With or Without Pelvis 2 - 3 View Right    Result Date: 3/16/2021  PELVIS AND RIGHT HIP    CLINICAL HISTORY: Right total hip arthroplasty in December of last year. Hip pain upon standing.  Compared to the previous right hip x-rays dated 12/16/2020.  An AP view the pelvis and AP and frog-leg lateral views of the right hip demonstrate total hip arthroplasty that appears in satisfactory position. There is no evidence of fracture or subluxation or loosening. The left hip joint is mildly narrowed but otherwise unremarkable. Mild osteopenia is noted.  This report was finalized on 3/16/2021 12:21 PM by Dr. Ignacio Disla M.D.        I ordered the above noted radiological studies. Independently reviewed by me and discussed with radiologist.  See dictation above for official radiology interpretation.      PROCEDURES    Procedures        MEDICATIONS GIVEN IN ER    Medications - No data to display      PROGRESS, DATA ANALYSIS, CONSULTS, AND MEDICAL DECISION MAKING    All labs have been independently reviewed by me.  All radiology studies have been reviewed by me.   EKG's independently reviewed by me.  Discussion below represents my analysis of pertinent findings related to patient's condition, differential diagnosis, treatment plan and final disposition.    DIFFERENTIAL DIAGNOSIS INCLUDE BUT NOT LIMITED TO: Hip dislocation, fracture, right hip strain, lumbar radiculopathy, referred knee pain, cellulitis, osteomyelitis, postop infection    ED Course   Tue Mar 16, 2021   1220 I viewed x-ray of right hip and pelvis on PACS system.  My findings are no fracture.  Status post total hip replacement with intact appearing hardware.    [JS]   1248 Reviewed x-ray results with patient.  She states her pain is improved from this morning.  She would like to try to ambulate.  With her cane the patient was able to stand bear weight and take  several steps.  She states it did cause her some pain but was much improved from this morning.  She notes this morning she was unable to bear any weight at all on the right lower extremity.    [JS]   5519 Gerson queried and reviewed.  There are no encounters since 12/17/2020 by Dr. Cobb..    [JS]   1305 Patient is now able to ambulate with assistive device.  X-rays are negative.  I do not believe this is referred back pain.  There are no signs of infection or symptoms suggestive thereof and her history of present illness.  I will prescribe her some pain medication for pain control and encourage her to follow-up with Dr. Cobb.  She is been given return precautions.  She is understanding is agreeable with the plan.    [JS]      ED Course User Index  [JS] Lonny Sulema Reza, JAYDE         BP - 176/71  HR - 99  TEMP - 98.4 °F (36.9 °C)  O2 SATS - 97%         Medication List      New Prescriptions    HYDROcodone-acetaminophen 5-325 MG per tablet  Commonly known as: NORCO  Take 1 tablet by mouth Every 6 (Six) Hours As Needed for Severe Pain .     lidocaine 5 %  Commonly known as: Lidoderm  Place 1 patch on the skin as directed by provider Daily. Remove & Discard patch within 12 hours or as directed by MD           Where to Get Your Medications      These medications were sent to [id number] - [place], [place] - [address] - [phone]  - [phone] FX  [address] [place] [address]    Phone: [phone]   · HYDROcodone-acetaminophen 5-325 MG per tablet  · lidocaine 5 %           DIAGNOSIS  Final diagnoses:   Hip strain, right, initial encounter   Status post total hip replacement, right         DISPOSITION  Discharge     Pt masked in first look. I wore appropriate PPE throughout my encounters with the pt. I performed hand hygiene on entry into the pt room and upon exit.     Dictated utilizing Dragon dictation:  Much of this encounter note is an electronic transcription/translation  of spoken language to printed text. The electronic translation of spoken language may permit erroneous, or at times, nonsensical words or phrases to be inadvertently transcribed; Although I have reviewed the note for such errors, some may still exist.     Sulema Mukherjee, APRN  03/17/21 1036

## 2021-03-16 NOTE — ED PROVIDER NOTES
I have supervised the care provided by the midlevel provider.    We have discussed this patient's history, physical exam, and treatment plan.   I have reviewed the note and have personally examined the patient and agree with the plan of care.  See attached attending note.  My personal findings are below:    Patient complains of sudden onset of right hip pain when she stood up from the toilet this morning.  She had a right hip replacement 3 months ago.  Denies numbness/tingling/weakness in her right leg.  Pain improves at rest and is worse with movement.    On exam: Awake and alert.  Heart is regular rate and rhythm.  Lungs are clear bilaterally.  Right hip is nontender.  There are no obvious deformities of the right lower extremity.  There is normal passive range of motion of the right hip.  Normal light touch sensation in the right foot.  Normal pedal pulses bilaterally.    Plan is to obtain x-rays.     Corby Frey MD  03/16/21 6451

## 2021-03-18 ENCOUNTER — OFFICE VISIT (OUTPATIENT)
Dept: ORTHOPEDIC SURGERY | Facility: CLINIC | Age: 76
End: 2021-03-18

## 2021-03-18 VITALS — TEMPERATURE: 98 F | BODY MASS INDEX: 25.49 KG/M2 | WEIGHT: 135 LBS | HEIGHT: 61 IN

## 2021-03-18 DIAGNOSIS — M25.551 RIGHT HIP PAIN: Primary | ICD-10-CM

## 2021-03-18 DIAGNOSIS — Z96.641 STATUS POST RIGHT HIP REPLACEMENT: ICD-10-CM

## 2021-03-18 PROCEDURE — 99213 OFFICE O/P EST LOW 20 MIN: CPT | Performed by: ORTHOPAEDIC SURGERY

## 2021-03-18 RX ORDER — AMLODIPINE BESYLATE 5 MG/1
5 TABLET ORAL DAILY
COMMUNITY
Start: 2021-03-15

## 2021-03-18 NOTE — PROGRESS NOTES
Patient: Maria Victoria Garcia  YOB: 1945 75 y.o. female  Medical Record Number: [id number]    Chief Complaints:   Chief Complaint   Patient presents with   • Right Hip - Establish Care, Pain       History of Present Illness:Maria Victoria Garcia is a 75 y.o. female who presents with complaints of right hip pain.  She had just gone on a walk with her dog 2 days ago when she was getting off the toilet when she felt a sharp twinge of pain and had difficulty bearing weight.  She is 3 months out from a right hip polychange.  She went to the emergency department a work-up ensued and they determined that her total hip replacement appeared satisfactory.  She is now on a cane and she was given some pain medicine which she is not taking.  She is able to bear weight but she still has soreness diffusely around the hip.  She states that lidocaine patches on the front and backside do help.    Allergies:   Allergies   Allergen Reactions   • Codeine Nausea And Vomiting   • Hydrocodone Nausea And Vomiting       Medications:   Current Outpatient Medications   Medication Sig Dispense Refill   • amLODIPine (NORVASC) 5 MG tablet Take 5 mg by mouth Daily.     • lidocaine (Lidoderm) 5 % Place 1 patch on the skin as directed by provider Daily. Remove & Discard patch within 12 hours or as directed by MD 10 patch 0   • ciprofloxacin (CIPRO) 250 MG tablet Take 250 mg by mouth 2 (Two) Times a Day.     • HYDROcodone-acetaminophen (NORCO) 5-325 MG per tablet Take 1 tablet by mouth Every 6 (Six) Hours As Needed for Severe Pain . 8 tablet 0   • ondansetron (ZOFRAN) 4 MG tablet Take 1 tablet by mouth Every 6 (Six) Hours As Needed for Nausea or Vomiting. 10 tablet 0   • pantoprazole (PROTONIX) 40 MG EC tablet Take 1 tablet by mouth Daily. 14 tablet 0   • phenazopyridine (PYRIDIUM) 200 MG tablet Take 200 mg by mouth 3 (Three) Times a Day.       No current facility-administered medications for this visit.     Facility-Administered Medications  "Ordered in Other Visits   Medication Dose Route Frequency Provider Last Rate Last Admin   • Chlorhexidine Gluconate 2 % pads 1 each  1 each Apply externally Take As Directed Fred Cobb MD             The following portions of the patient's history were reviewed and updated as appropriate: allergies, current medications, past family history, past medical history, past social history, past surgical history and problem list.    Review of Systems:   A 14 point review of systems was performed. All systems negative except pertinent positives/negative listed in HPI above    Physical Exam:   Vitals:    03/18/21 0800   Temp: 98 °F (36.7 °C)   Weight: 61.2 kg (135 lb)   Height: 154.9 cm (61\")   PainSc:   1       General: A and O x 3, ASA, NAD    SCLERA:    Normal    DENTITION:   Normal  On exam her right lower extremity looks roughly 1 cm longer than the left and she has slight leftward truncal shift she has mild irritability with hip range of motion.  She walks with a antalgic gait with a cane       Radiology:  Xrays 2views right hip (AP bilateral hips, and lateral of the hip) taken at the hospital were reviewed  demonstrating  a well positioned total hip without evidence of wear, loosening, or osteoarthritis    Assessment/Plan: Right hip region pain this may be lumbar source.  Clearly she is off balance from a lumbar spine perspective.  I am going to give her a just a heel lift for the left leg to help balance her lumbar area she will continue to use the lidocaine patches and rest.  She can participate in activities as tolerated.  If she is not better in a month she will call back and see me if not we will see her at her annual follow-up in December.      Fred Cobb MD  3/18/2021  "

## 2021-08-05 ENCOUNTER — OFFICE VISIT (OUTPATIENT)
Dept: ORTHOPEDIC SURGERY | Facility: CLINIC | Age: 76
End: 2021-08-05

## 2021-08-05 VITALS — TEMPERATURE: 98 F | WEIGHT: 137 LBS | BODY MASS INDEX: 25.86 KG/M2 | HEIGHT: 61 IN

## 2021-08-05 DIAGNOSIS — Z96.9 PRESENCE OF RETAINED HARDWARE: ICD-10-CM

## 2021-08-05 DIAGNOSIS — R52 PAIN: Primary | ICD-10-CM

## 2021-08-05 DIAGNOSIS — M21.40 PES PLANUS, UNSPECIFIED LATERALITY: ICD-10-CM

## 2021-08-05 DIAGNOSIS — M25.872 IMPINGEMENT SYNDROME OF LEFT ANKLE: ICD-10-CM

## 2021-08-05 PROCEDURE — 73610 X-RAY EXAM OF ANKLE: CPT | Performed by: ORTHOPAEDIC SURGERY

## 2021-08-05 PROCEDURE — 99213 OFFICE O/P EST LOW 20 MIN: CPT | Performed by: ORTHOPAEDIC SURGERY

## 2021-08-05 RX ORDER — MIRABEGRON 25 MG/1
TABLET, FILM COATED, EXTENDED RELEASE ORAL
COMMUNITY
Start: 2021-07-29 | End: 2021-12-16

## 2021-08-05 NOTE — PROGRESS NOTES
New Patient Complaint      Patient: Maria Victoria Garcia  YOB: 1945 76 y.o. female  Medical Record Number: [id number]    Chief Complaints: My ankle is sore    History of Present Illness: Patient had operative treatment of her left ankle in December 1986 for what looks like a medial malleolus fracture.  This was done elsewhere.    She saw Dr. Romano in 2015 after spraining her ankle and I did review some of those notes today.  She had an MRI that showed previous fracture of the medial malleolus with soft tissue of the dorsum of the foot and some degenerative changes of the midfoot and posterior subtalar joint    She says she done relatively well until back in the spring around March or April and had begun having some discomfort in the inferolateral aspect of the left hindfoot.  Prior to that (although she did not make the connection) she had been seen by Dr. Cobb after right hip surgery and was felt to have a small leg length discrepancy and was placed into a heel lift.    She really does not have any complaints of pain laterally but only gets intermittent pain in the inferomedial aspect of the left.  She has severe hallux valgus deformity but really no specific complaints in the forefoot.  Hindfoot.        HPI    Allergies:   Allergies   Allergen Reactions   • Codeine Nausea And Vomiting   • Hydrocodone Nausea And Vomiting       Medications:   Current Outpatient Medications on File Prior to Visit   Medication Sig   • amLODIPine (NORVASC) 5 MG tablet Take 5 mg by mouth Daily.   • Myrbetriq 25 MG tablet sustained-release 24 hour 24 hr tablet    • ciprofloxacin (CIPRO) 250 MG tablet Take 250 mg by mouth 2 (Two) Times a Day.   • HYDROcodone-acetaminophen (NORCO) 5-325 MG per tablet Take 1 tablet by mouth Every 6 (Six) Hours As Needed for Severe Pain .   • lidocaine (Lidoderm) 5 % Place 1 patch on the skin as directed by provider Daily. Remove & Discard patch within 12 hours or as directed by MD   •  ondansetron (ZOFRAN) 4 MG tablet Take 1 tablet by mouth Every 6 (Six) Hours As Needed for Nausea or Vomiting.   • pantoprazole (PROTONIX) 40 MG EC tablet Take 1 tablet by mouth Daily.   • phenazopyridine (PYRIDIUM) 200 MG tablet Take 200 mg by mouth 3 (Three) Times a Day.     Current Facility-Administered Medications on File Prior to Visit   Medication   • Chlorhexidine Gluconate 2 % pads 1 each       Past Medical History:   Diagnosis Date   • Ankle sprain    • Cancer (CMS/HCC)    • Fracture of ankle    • Fracture of wrist    • Fracture, finger    • Fracture, foot    • Frozen shoulder    • History of gallbladder cancer    • History of transfusion    • PONV (postoperative nausea and vomiting)    • Right hip pain    • Wrist sprain      Past Surgical History:   Procedure Laterality Date   • ANKLE OPEN REDUCTION INTERNAL FIXATION  1990   • APPENDECTOMY     • COLONOSCOPY     • GALLBLADDER SURGERY  08/1998   • HIP MINI REVISION Right 12/16/2020    Procedure: TOTAL HIP ARTHROPLASTY liner exchange;  Surgeon: Fred Cobb MD;  Location: [facility];  Service: Orthopedics;  Laterality: Right;   • HIP SURGERY Bilateral 1989    ALSO 1991; REPLACEMENT   • JOINT REPLACEMENT     • NASAL SEPTUM SURGERY     • ORIF ANKLE FRACTURE Left      Social History     Occupational History   • Not on file   Tobacco Use   • Smoking status: Never Smoker   • Smokeless tobacco: Never Used   Vaping Use   • Vaping Use: Never used   Substance and Sexual Activity   • Alcohol use: No   • Drug use: Never   • Sexual activity: Not Currently      Social History     Social History Narrative   • Not on file     Family History   Problem Relation Age of Onset   • Lung cancer Other    • Pancreatic cancer Other    • Cancer Brother    • Cancer Sister    • Cancer Mother    • Malig Hyperthermia Neg Hx        Review of Systems: 14 point review of systems performed, positive pertinent findings identified in HPI. All remaining systems negative     Review of  "Systems      Physical Exam:   Vitals:    08/05/21 1300   Temp: 98 °F (36.7 °C)   Weight: 62.1 kg (137 lb)   Height: 154.9 cm (61\")   PainSc:   1     Physical Exam   Constitutional: pleasant, well developed   Eyes: sclera non icteric  Hearing : adequate for exam  Cardiovascular: palpable pulses in left foot, left calf/ thigh NT without sign of DVT  Respiratoy: breathing unlabored   Neurological: grossly sensate to LT throughout left LE  Psychiatric: oriented with normal mood and affect.   Lymphatic: No palpable popliteal lymphadenopathy left LE  Skin: intact throughout left leg/foot  Musculoskeletal: On standing she has mild planovalgus alignment there is mild discomfort in the inferolateral hindfoot along the sinus tarsi but really none along the fibula.  She has well-healed incision of the medial malleolus without focal tenderness.  She has severe hallux valgus deformity and multiple hammertoes but no skin breakdown.  Neutral dorsiflexion of the heel cord  Physical Exam  Ortho Exam    Radiology: 3 views left ankle ordered evaluate pain reviewed and compared with prior x-rays from 7/1/2015.  There remains a screw in the medial malleolus that projects posteriorly the previous medial malleolar fracture appears to have healed I do not see any sign of recurrent fracture or any appreciable change as far as lucency etc. compared with previous x-rays.  There is some mild arthritic change to the ankle.    Assessment/Plan: 1.  Left inferomedial hindfoot pain with pes planus/posterior tib tendon dysfunction and probable lateral ankle impingement with exacerbation of pain possibly from the subtalar joint after using heel lift  2.  Left previous distal medial tibia fracture without sign of infection or loosening  3.  Severe hallux valgus with multiple hammertoes.    We had a long discussion regarding treatment options and symptoms are only intermittent and began refusing the heel lift and I think may have exacerbated some of " the lateral hindfoot impingement with pes planus and possible subtalar arthritis.    As symptoms are only intermittent and not all that bothersome we decided to hold off on further imaging at this time.    We will have her use a power step orthotic rather than the heel lift that she has been using or she may use some old orthotics that she had from [facility] as I think this will help unload the sinus tarsi.    Also demonstrated heel cord stretching exercises to do at least 4 times a day.  Instruction sheet was provided and demonstrated for were discussed etiology of pes planus and posterior tib tendon dysfunction to Achilles tightness.    She may also apply Voltaren gel to the area twice daily.    Offered to see her back in follow-up and she said she would just call if symptoms persist or worsen and if so we will get an MRI of her left hindfoot otherwise I will see her back as needed

## 2021-09-14 ENCOUNTER — ON CAMPUS - OUTPATIENT (OUTPATIENT)
Dept: URBAN - METROPOLITAN AREA HOSPITAL 77 | Facility: HOSPITAL | Age: 76
End: 2021-09-14
Payer: MEDICARE

## 2021-09-14 DIAGNOSIS — Z83.71 FAMILY HISTORY OF COLONIC POLYPS: ICD-10-CM

## 2021-09-14 DIAGNOSIS — K64.1 SECOND DEGREE HEMORRHOIDS: ICD-10-CM

## 2021-09-14 DIAGNOSIS — Z08 ENCOUNTER FOR FOLLOW-UP EXAMINATION AFTER COMPLETED TREATMEN: ICD-10-CM

## 2021-09-14 PROCEDURE — 45378 DIAGNOSTIC COLONOSCOPY: CPT | Mod: 33 | Performed by: INTERNAL MEDICINE

## 2021-12-16 ENCOUNTER — OFFICE VISIT (OUTPATIENT)
Dept: ORTHOPEDIC SURGERY | Facility: CLINIC | Age: 76
End: 2021-12-16

## 2021-12-16 VITALS — WEIGHT: 137 LBS | TEMPERATURE: 96.8 F | HEIGHT: 61 IN | BODY MASS INDEX: 25.86 KG/M2

## 2021-12-16 DIAGNOSIS — R52 PAIN: Primary | ICD-10-CM

## 2021-12-16 DIAGNOSIS — Z96.641 STATUS POST RIGHT HIP REPLACEMENT: ICD-10-CM

## 2021-12-16 PROCEDURE — 99213 OFFICE O/P EST LOW 20 MIN: CPT | Performed by: ORTHOPAEDIC SURGERY

## 2021-12-16 PROCEDURE — 73502 X-RAY EXAM HIP UNI 2-3 VIEWS: CPT | Performed by: ORTHOPAEDIC SURGERY

## 2021-12-16 NOTE — PROGRESS NOTES
"Patient: Maria Victoria Garcia  YOB: 1945 76 y.o. female  Medical Record Number: [id number]    Chief Complaint:   Chief Complaint   Patient presents with   • Right Hip - Follow-up       History of Present Illness:Maria Victoria Garcia is a 76 y.o. female who presents for follow-up of right hip polychange feels weakness and soreness about the abductors of the right hip    Allergies:   Allergies   Allergen Reactions   • Codeine Nausea And Vomiting   • Hydrocodone Nausea And Vomiting       Medications:   Current Outpatient Medications   Medication Sig Dispense Refill   • amLODIPine (NORVASC) 5 MG tablet Take 5 mg by mouth Daily.     • lidocaine (Lidoderm) 5 % Place 1 patch on the skin as directed by provider Daily. Remove & Discard patch within 12 hours or as directed by MD 10 patch 0     No current facility-administered medications for this visit.     Facility-Administered Medications Ordered in Other Visits   Medication Dose Route Frequency Provider Last Rate Last Admin   • Chlorhexidine Gluconate 2 % pads 1 each  1 each Apply externally Take As Directed Fred Cobb MD             The following portions of the patient's history were reviewed and updated as appropriate: allergies, current medications, past family history, past medical history, past social history, past surgical history and problem list.    Review of Systems:   A 14 point review of systems was performed. All systems negative except pertinent positives/negative listed in HPI above    Physical Exam:   Vitals:    12/16/21 1253   Temp: 96.8 °F (36 °C)   Weight: 62.1 kg (137 lb)   Height: 154.9 cm (61\")       General: A and O x 3, ASA, NAD    SCLERA:    Normal    DENTITION:   Normal  She has a truncal shift to the left side her right leg is about a centimeter longer than the left well-healed incision hip strength 4 out of 5 she is intact light touch pedal distal pulses walks with slight longer gait    Radiology:    Xrays 2views hip (AP bilateral " hips and lateral hip) were ordered and reviewed for evaluation of previous total hip replacement demonstrating a well positioned total hip without evidence of wear, loosening, or osteolysis.  The right hip is longer than the left by about 3/4 to 1 cm  Comparison views: todays xrays were compared to previous xrays and demonstrate no change    Assessment/Plan:  Right total hip likely discrepancy of recommended she use a heel lift on the left side I will send her to physical therapy for abductor strengthening.  I will see her back as needed      Fred Cobb MD  12/16/2021

## 2022-01-05 ENCOUNTER — TREATMENT (OUTPATIENT)
Dept: PHYSICAL THERAPY | Facility: CLINIC | Age: 77
End: 2022-01-05

## 2022-01-05 DIAGNOSIS — M25.551 RIGHT HIP PAIN: ICD-10-CM

## 2022-01-05 DIAGNOSIS — M51.36 DDD (DEGENERATIVE DISC DISEASE), LUMBAR: ICD-10-CM

## 2022-01-05 DIAGNOSIS — R29.898 WEAKNESS OF RIGHT HIP: ICD-10-CM

## 2022-01-05 DIAGNOSIS — M41.25 OTHER IDIOPATHIC SCOLIOSIS, THORACOLUMBAR REGION: ICD-10-CM

## 2022-01-05 DIAGNOSIS — Z96.649 S/P REVISION OF TOTAL HIP: Primary | ICD-10-CM

## 2022-01-05 PROCEDURE — 97110 THERAPEUTIC EXERCISES: CPT | Performed by: PHYSICAL THERAPIST

## 2022-01-05 PROCEDURE — 97162 PT EVAL MOD COMPLEX 30 MIN: CPT | Performed by: PHYSICAL THERAPIST
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
## 2022-01-10 ENCOUNTER — TREATMENT (OUTPATIENT)
Dept: PHYSICAL THERAPY | Facility: CLINIC | Age: 77
End: 2022-01-10

## 2022-01-10 DIAGNOSIS — Z96.649 S/P REVISION OF TOTAL HIP: Primary | ICD-10-CM

## 2022-01-10 DIAGNOSIS — R29.898 WEAKNESS OF RIGHT HIP: ICD-10-CM

## 2022-01-10 DIAGNOSIS — M25.551 RIGHT HIP PAIN: ICD-10-CM

## 2022-01-10 PROCEDURE — 97110 THERAPEUTIC EXERCISES: CPT | Performed by: PHYSICAL THERAPIST

## 2022-01-10 NOTE — PROGRESS NOTES
"Physical Therapy Daily Treatment Note      Patient: Maria Victoria Garcia   : 1945  Referring practitioner: Fred Cobb MD  Date of Initial Visit: Type: THERAPY  Noted: 2022  Today's Date: 1/10/2022  Patient seen for 2 sessions       Visit Diagnoses:    ICD-10-CM ICD-9-CM   1. S/P revision of total hip  Z96.649 V43.64   2. Right hip pain  M25.551 719.45   3. Weakness of right hip  R29.898 729.89       Subjective Evaluation    History of Present Illness    Subjective comment: doing well so far. has a lift in her shoe that makes her feel too tall now. too much lift with the full adjusta-lift but feels good when 2 layers taken off so now just 3/8\" in shoe.        Objective   See Exercise, Manual, and Modality Logs for complete treatment.       Assessment & Plan     Assessment    Assessment details: Did well today with review and performing HEP. Added work for core and balance today as she is a little unsteady at times, and showed weakness when rolling and coming to sit.  P: review HEP as needed. Add tyrone to clam          Timed:         Manual Therapy:         mins  11017;     Therapeutic Exercise:    45     mins  55799;     Neuromuscular Otf:        mins  62386;    Therapeutic Activity:          mins  31873;     Gait Training:           mins  42923;     Ultrasound:          mins  19818;    Ionto                                   mins   39389  Self Care                            mins   53743  Canalith Repos         mins 38705      Un-Timed:  Electrical Stimulation:         mins  53072 ( );  Dry Needling          mins self-pay  Traction          mins 09850      Timed Treatment:   45   mins   Total Treatment:     45   mins    Zorre Zeno Kimura, PT  [place] License: [id number]    [place] License:  [id number]  "

## 2022-01-17 ENCOUNTER — TREATMENT (OUTPATIENT)
Dept: PHYSICAL THERAPY | Facility: CLINIC | Age: 77
End: 2022-01-17

## 2022-01-17 DIAGNOSIS — R29.898 WEAKNESS OF RIGHT HIP: ICD-10-CM

## 2022-01-17 DIAGNOSIS — M51.36 DDD (DEGENERATIVE DISC DISEASE), LUMBAR: ICD-10-CM

## 2022-01-17 DIAGNOSIS — M41.25 OTHER IDIOPATHIC SCOLIOSIS, THORACOLUMBAR REGION: ICD-10-CM

## 2022-01-17 DIAGNOSIS — M25.551 RIGHT HIP PAIN: ICD-10-CM

## 2022-01-17 DIAGNOSIS — Z96.649 S/P REVISION OF TOTAL HIP: Primary | ICD-10-CM

## 2022-01-17 PROCEDURE — 97110 THERAPEUTIC EXERCISES: CPT | Performed by: PHYSICAL THERAPIST

## 2022-01-17 PROCEDURE — 97112 NEUROMUSCULAR REEDUCATION: CPT | Performed by: PHYSICAL THERAPIST

## 2022-01-17 NOTE — PROGRESS NOTES
Physical Therapy Daily Treatment Note      Patient: Maria Victoria Garcia   : 1945  Referring practitioner: Fred Cobb MD  Date of Initial Visit: Type: THERAPY  Noted: 2022  Today's Date: 2022  Patient seen for 3 sessions       Visit Diagnoses:    ICD-10-CM ICD-9-CM   1. S/P revision of total hip  Z96.649 V43.64   2. Right hip pain  M25.551 719.45   3. Weakness of right hip  R29.898 729.89   4. DDD (degenerative disc disease), lumbar  M51.36 722.52   5. Other idiopathic scoliosis, thoracolumbar region  M41.25 737.30       Subjective Evaluation    History of Present Illness    Subjective comment: doing okay today. lift in her shoes seems right. did some cooking and cleaning over the weekend and doing okay       Objective   See Exercise, Manual, and Modality Logs for complete treatment.       Assessment & Plan     Assessment    Assessment details: Hip still shifted with scoliosis but trendelenburg not too bad and able to work on tandem gait exercises without falling but LOB was frequent.    Hip abduction getting better stronger.   P: cont with teach back of HEP before adding new ex. Pt using computer at home for new HEP via [facility]          Timed:         Manual Therapy:         mins  83915;     Therapeutic Exercise:    30     mins  09305;     Neuromuscular Otf:    15    mins  43783;    Therapeutic Activity:          mins  89555;     Gait Training:           mins  93642;     Ultrasound:          mins  87745;    Ionto                                   mins   58562  Self Care                            mins   34757  Canalith Repos         mins 73528      Un-Timed:  Electrical Stimulation:         mins  32400 ( );  Dry Needling          mins self-pay  Traction          mins 78497      Timed Treatment:   45   mins   Total Treatment:     45   mins    Zorre Zeno Kimura, PT  [place] License: [id number]    [place] License:  [id number]

## 2022-01-26 ENCOUNTER — TREATMENT (OUTPATIENT)
Dept: PHYSICAL THERAPY | Facility: CLINIC | Age: 77
End: 2022-01-26

## 2022-01-26 DIAGNOSIS — M51.36 DDD (DEGENERATIVE DISC DISEASE), LUMBAR: ICD-10-CM

## 2022-01-26 DIAGNOSIS — M25.551 RIGHT HIP PAIN: ICD-10-CM

## 2022-01-26 DIAGNOSIS — R29.898 WEAKNESS OF RIGHT HIP: ICD-10-CM

## 2022-01-26 DIAGNOSIS — Z96.649 S/P REVISION OF TOTAL HIP: Primary | ICD-10-CM

## 2022-01-26 DIAGNOSIS — M41.25 OTHER IDIOPATHIC SCOLIOSIS, THORACOLUMBAR REGION: ICD-10-CM

## 2022-01-26 PROCEDURE — 97110 THERAPEUTIC EXERCISES: CPT | Performed by: PHYSICAL THERAPIST

## 2022-01-26 PROCEDURE — 97112 NEUROMUSCULAR REEDUCATION: CPT | Performed by: PHYSICAL THERAPIST

## 2022-01-26 NOTE — PROGRESS NOTES
Physical Therapy Daily Treatment Note      Patient: Maria Victoria Garcia   : 1945  Referring practitioner: Fred Cobb MD  Date of Initial Visit: Type: THERAPY  Noted: 2022  Today's Date: 2022  Patient seen for 4 sessions       Visit Diagnoses:    ICD-10-CM ICD-9-CM   1. S/P revision of total hip  Z96.649 V43.64   2. Right hip pain  M25.551 719.45   3. Weakness of right hip  R29.898 729.89   4. DDD (degenerative disc disease), lumbar  M51.36 722.52   5. Other idiopathic scoliosis, thoracolumbar region  M41.25 737.30       Subjective Evaluation    History of Present Illness    Subjective comment: doing okay but admits she hasn't had as much time to exercise caring for her older sister with severe neck pain and reaction to pain meds. No plans to see ortho at this time. Would like to see us weekly a few more sessions.        Objective   See Exercise, Manual, and Modality Logs for complete treatment.       Assessment & Plan     Assessment    Assessment details: Did well today but not progressing with clam or SL hip abd strength yet.  Added red tbands for HL ab at home.  Added side step ups with significant weakness R vs. L  Also added leg press just 20# but did well.     P: review HEP ex as needed. Drill with sets of lateral steps ups.            Timed:         Manual Therapy:         mins  74132;     Therapeutic Exercise:    30     mins  50613;     Neuromuscular Otf:    15    mins  78422;    Therapeutic Activity:          mins  04069;     Gait Training:           mins  13885;     Ultrasound:          mins  40137;    Ionto                                   mins   00013  Self Care                            mins   60346  Canalith Repos         mins 31516      Un-Timed:  Electrical Stimulation:         mins  98713 ( );  Dry Needling          mins self-pay  Traction          mins 47070      Timed Treatment:   45   mins   Total Treatment:     45   mins    Zorre Zeno Kimura, PT  [place] License:  [id number]    [place] License:  [id number]

## 2022-02-07 ENCOUNTER — TREATMENT (OUTPATIENT)
Dept: PHYSICAL THERAPY | Facility: CLINIC | Age: 77
End: 2022-02-07

## 2022-02-07 DIAGNOSIS — M51.36 DDD (DEGENERATIVE DISC DISEASE), LUMBAR: ICD-10-CM

## 2022-02-07 DIAGNOSIS — R29.898 WEAKNESS OF RIGHT HIP: ICD-10-CM

## 2022-02-07 DIAGNOSIS — Z96.649 S/P REVISION OF TOTAL HIP: Primary | ICD-10-CM

## 2022-02-07 DIAGNOSIS — M41.25 OTHER IDIOPATHIC SCOLIOSIS, THORACOLUMBAR REGION: ICD-10-CM

## 2022-02-07 DIAGNOSIS — M25.551 RIGHT HIP PAIN: ICD-10-CM

## 2022-02-07 PROCEDURE — 97110 THERAPEUTIC EXERCISES: CPT | Performed by: PHYSICAL THERAPIST

## 2022-02-07 PROCEDURE — 97112 NEUROMUSCULAR REEDUCATION: CPT | Performed by: PHYSICAL THERAPIST

## 2022-02-07 NOTE — PROGRESS NOTES
Physical Therapy Daily Note / 30 day Progress Note    Patient: Maria Victoria Garcia  : 1945  Referring practitioner: Fred Cobb MD  Date of Initial Visit: Type: THERAPY  Noted: 2022  Today's Date: 2022  Patient seen for 5 sessions      Visit Diagnoses:    ICD-10-CM ICD-9-CM   1. S/P revision of total hip  Z96.649 V43.64   2. Right hip pain  M25.551 719.45   3. Weakness of right hip  R29.898 729.89   4. DDD (degenerative disc disease), lumbar  M51.36 722.52   5. Other idiopathic scoliosis, thoracolumbar region  M41.25 737.30       VISIT#: 5    Subjective   Maria Victoria Garcia reports performing HEP every other day. She explains that she has had a hard time with walking.  She feels like therapy has been helping her and she is working on her exercises at home for relief.    Pain Rating (0-10): 3/10 and only when moving.  Lower Extremity Functional Scale: previously 53/80 points    Objective          Static Posture     Thoracic Spine  Convex curve left, shifted left and hyperkyphosis.    Lumbar Spine   Lumbar spine (Left): Convex curve and shifted.     Pelvis   Pelvis (Right): Elevated.     Active Range of Motion   Left Hip   Flexion: 112 degrees   External rotation (90/90): 31 degrees   Internal rotation (90/90): 29 degrees     Right Hip   Flexion: 113 degrees   External rotation (90/90): 9 degrees   Internal rotation (90/90): 29 degrees     Additional Active Range of Motion Details  Rotation measured active in sitting w/ towel under knee    Strength/Myotome Testing     Left Hip   Planes of Motion   Flexion: 4+  Abduction: 4+    Right Hip   Planes of Motion   Flexion: 4  Abduction: 4-    Ambulation   Weight-Bearing Status   Assistive device used: none    Additional Weight-Bearing Status Details  States she will use a cane if dark outside or pain bothers her      See Exercise, Manual, and Modality Logs for complete treatment.     Patient Education: Scar adhesions and their limiting capabilities. Gluteus medius  function during walking.      Assessment & Plan     Assessment  Impairments: abnormal gait, abnormal or restricted ROM, activity intolerance, impaired physical strength, lacks appropriate home exercise program and pain with function  Functional Limitations: carrying objects, lifting, sleeping, walking, pulling, pushing, uncomfortable because of pain, moving in bed, sitting and standing  Assessment details: Chelsey presents this visit for progress note. Due to prior history of repeated surgeries, scar inspection was implemented to rule out scar adhesions.   After scar inspection, manipulation of soft tissue and scar mobilization added to address mild tension of right hip glutes and IT band area.  Due to pt report of HEP causing some lumbar pain, exercises modified to prevent over twisting and overflow use of lumbar muscles.  Pt would benefit from continued skilled therapy to increase strength and build endurance to improve function.  Prognosis: fair    Goals  Plan Goals: STGs 4 weeks:  1. Pt indep in light HEP for R hip and L LBP- PARTIALLY MET  2. Pt to show improved R hip abduction from 4- to 4/5 strength- PARTIALLY MET  3. R hip ER/IR AROM to improve by 5-10 degrees from baseline- PARTIALLY MET  4. Pt to state improved tolerance to standing for 30 minutes before LBP- PARTIALLY MET  5. Pt to consider obtaining imaging for lumbar spine to manage possible issues down the road- NOT MET  LTGs 8-12 weeks:  1. Pt to have a good HEP in place and ex 3x/week - PARTIALLY MET  2. Pt to return to working 4 hour shifts, 2-3 days/week with no significant LBP - NOT MET  3. Pt to state good comfort for gait pattern with appropriate lift to L shoe - NOT MET  4. LBP no more than 2/10 with ADLs.- PARTIALLY MET  5. LEFS score to improve from 53 to > 63/80 by dc- NOT MET    Plan  Therapy options: will be seen for skilled therapy services  Planned modality interventions: TENS, ultrasound, thermotherapy (hydrocollator packs) and  cryotherapy  Planned therapy interventions: manual therapy, neuromuscular re-education, soft tissue mobilization, strengthening, stretching, home exercise program, therapeutic activities, functional ROM exercises, flexibility, balance/weight-bearing training, ADL retraining, abdominal trunk stabilization and joint mobilization  Frequency: 1x week  Duration in visits: 7  Duration in weeks: 7  Treatment plan discussed with: patient         Address supine to sitting body mechanics next visit.     Timed:         Manual Therapy:    7     mins  01559;     Therapeutic Exercise:    30     mins  41047;     Neuromuscular Otf:    8    mins  04954;    Therapeutic Activity:          mins  61775;     Gait Training:           mins  13212;     Ultrasound:          mins  10920;    Ionto                                   mins   48806  Self Care                            mins   91034    Un-Timed:  Electrical Stimulation:         mins  54595 ( );  Traction          mins 16301        Timed Treatment:   45   mins   Total Treatment:     45   mins    Natalee Berg, Student PTA    30 day Progress Note completed by Almaz Matta, PT    Almaz Matta, PT  Physical Therapist  [place] License #: [id number]    I was present in the room guiding the student, directing the service, and making the skilled judgement for all services rendered.

## 2022-02-14 ENCOUNTER — TREATMENT (OUTPATIENT)
Dept: PHYSICAL THERAPY | Facility: CLINIC | Age: 77
End: 2022-02-14

## 2022-02-14 DIAGNOSIS — R29.898 WEAKNESS OF RIGHT HIP: ICD-10-CM

## 2022-02-14 DIAGNOSIS — M41.25 OTHER IDIOPATHIC SCOLIOSIS, THORACOLUMBAR REGION: ICD-10-CM

## 2022-02-14 DIAGNOSIS — M51.36 DDD (DEGENERATIVE DISC DISEASE), LUMBAR: ICD-10-CM

## 2022-02-14 DIAGNOSIS — Z96.649 S/P REVISION OF TOTAL HIP: Primary | ICD-10-CM

## 2022-02-14 DIAGNOSIS — M25.551 RIGHT HIP PAIN: ICD-10-CM

## 2022-02-14 PROCEDURE — 97110 THERAPEUTIC EXERCISES: CPT | Performed by: PHYSICAL THERAPIST

## 2022-02-14 PROCEDURE — 97140 MANUAL THERAPY 1/> REGIONS: CPT | Performed by: PHYSICAL THERAPIST

## 2022-02-14 PROCEDURE — 97112 NEUROMUSCULAR REEDUCATION: CPT | Performed by: PHYSICAL THERAPIST

## 2022-02-14 NOTE — PROGRESS NOTES
"Physical Therapy Daily Progress Note    Patient: Maria Victoria Garcia  : 1945  Referring practitioner: Fred Cobb MD  Date of Initial Visit: Type: THERAPY  Noted: 2022  Today's Date: 2022  Patient seen for 6 sessions      Visit Diagnoses:    ICD-10-CM ICD-9-CM   1. S/P revision of total hip  Z96.649 V43.64   2. Right hip pain  M25.551 719.45   3. Weakness of right hip  R29.898 729.89   4. DDD (degenerative disc disease), lumbar  M51.36 722.52   5. Other idiopathic scoliosis, thoracolumbar region  M41.25 737.30       VISIT#: 6    Subjective   Maria Victoria Garcia reports feeling sore after last visit in her inner thighs, specifically L>R.    When prompted, Chelsey reports difficulty with voiding her urination and bowels. She explains that it is much better (when compared to immediately after surgery) but it \"just doesn't feel right.\" She says her bowel changes don't always correlate to her diet changes.    Chelsey explains that her back pain is primarily on her left side after moving and that she feels the hamstring nerve floss more R>L.    Pain Rating (0-10): 1/10 at rest; 5/10 while walking or standing.    Objective     See Exercise, Manual, and Modality Logs for complete treatment.     Patient Education: Addressed supine to sitting body mechanics to prevent lumbar torsion, pelvic floor location anatomy and LE/hip carry-over/compensation during healing process. Clamshells, low trunk rotation, piriformis stretch, PPT, bridges added to HEP on [employer] access code: [email or web address]    Assessment & Plan     Assessment    Assessment details: Chelsey presents this visit with increased soreness of her left adductors, possibly due to overuse during her trendelenburg gait. Pt educated that \"sway gait\" could be due to glute med weakness causing overuse of hip adductors, which would influence pelvic floor area. Due to past supine to sit transition presentation, log roll reviewed. Due to pt report of difficulty with walking/standing, " special focus/instruction on hip ABD exercises. Chelsey would benefit from continued skilled PT to address her strength deficits and decrease her pain, which would improve her walking distance and function.        Progress per Plan of Care and Progress strengthening /stabilization /functional activity     Address stomach strength/stabilization to prevent complications w/ DDD.     Timed:         Manual Therapy:    8     mins  94433;     Therapeutic Exercise:    27     mins  48440;     Neuromuscular Otf:  10    mins  07827;    Therapeutic Activity:          mins  94381;     Gait Training:           mins  60303;     Ultrasound:          mins  28459;    Ionto                                   mins   54518  Self Care                            mins   87417    Un-Timed:  Electrical Stimulation:         mins  24473 ( );  Traction          mins 44932        Timed Treatment:   45   mins   Total Treatment:     45   mins    Natalee Berg  PTA Student    Almza Matta, PT  Physical Therapist  [place] License #: [id number]    I was present in the room guiding the student, directing the service, and making the skilled judgement for all services rendered.

## 2022-02-21 ENCOUNTER — TREATMENT (OUTPATIENT)
Dept: PHYSICAL THERAPY | Facility: CLINIC | Age: 77
End: 2022-02-21

## 2022-02-21 DIAGNOSIS — M51.36 DDD (DEGENERATIVE DISC DISEASE), LUMBAR: ICD-10-CM

## 2022-02-21 DIAGNOSIS — Z96.649 S/P REVISION OF TOTAL HIP: Primary | ICD-10-CM

## 2022-02-21 DIAGNOSIS — M25.551 RIGHT HIP PAIN: ICD-10-CM

## 2022-02-21 DIAGNOSIS — R29.898 WEAKNESS OF RIGHT HIP: ICD-10-CM

## 2022-02-21 DIAGNOSIS — M41.25 OTHER IDIOPATHIC SCOLIOSIS, THORACOLUMBAR REGION: ICD-10-CM

## 2022-02-21 PROCEDURE — 97112 NEUROMUSCULAR REEDUCATION: CPT | Performed by: PHYSICAL THERAPIST

## 2022-02-21 PROCEDURE — 97110 THERAPEUTIC EXERCISES: CPT | Performed by: PHYSICAL THERAPIST

## 2022-02-21 PROCEDURE — 97140 MANUAL THERAPY 1/> REGIONS: CPT | Performed by: PHYSICAL THERAPIST

## 2022-02-21 NOTE — PROGRESS NOTES
"Physical Therapy Daily Progress Note    Patient: Maria Victoria Garcia  : 1945  Referring practitioner: Fred Cobb MD  Date of Initial Visit: Type: THERAPY  Noted: 2022  Today's Date: 2022  Patient seen for 7 sessions      Visit Diagnoses:    ICD-10-CM ICD-9-CM   1. S/P revision of total hip  Z96.649 V43.64   2. Right hip pain  M25.551 719.45   3. Weakness of right hip  R29.898 729.89   4. DDD (degenerative disc disease), lumbar  M51.36 722.52   5. Other idiopathic scoliosis, thoracolumbar region  M41.25 737.30       VISIT#: 7    Subjective   Maria Victoria Garcia reports having a right hip \"hitch\" upon getting out of her car in parking lot, located near her right piriformis. She describes that she used to keep a cane in the car but that she forgot it.     She explains that she's uncertain about the difference between pelvic tilts and bridges.    Pain Rating (0-10): 8/10 during \"hitch\" event. Though it fades enough for her to eventually walk.     Objective     See Exercise, Manual, and Modality Logs for complete treatment.     Patient Education: Retraining of pelvic tilts/bridges to avoid \"overflow\" of muscle use. Discussion of shoulder ER with red theraband. Review of log roll to prevent excessive spine torsion.     Assessment & Plan     Assessment    Assessment details: Due to Chelsey's uncertainty with HEP, review of bridging, pelvic tilts, clamshell, and LTR with additional tactile/verbal cuing for muscle isolation. Manual therapy modified to address both hips today due to pt report. Additional time spent cuing abdominal contraction today to prevent lumbar substitution during exercises. Chelsey would benefit from continued skilled therapy to increase strength, build lumbar coordination/endurance, and prevent pain during gait.        Progress per Plan of Care and Progress strengthening /stabilization /functional activity     Explore UE exercises.     Timed:         Manual Therapy:    8     mins  29203;   "   Therapeutic Exercise:    22     mins  79156;     Neuromuscular Otf:   10    mins  78506;    Therapeutic Activity:          mins  43627;     Gait Training:           mins  92001;     Ultrasound:          mins  15589;    Ionto                                   mins   66111  Self Care                            mins   55537    Un-Timed:  Electrical Stimulation:         mins  20749 ( );  Traction          mins 34765        Timed Treatment:   40   mins   Total Treatment:     40   mins    Natalee Berg  PTA Student    Almaz Matta, PT  Physical Therapist  [place] License #: [id number]    I was present in the room guiding the student, directing the service, and making the skilled judgement for all services rendered.

## 2022-02-28 ENCOUNTER — TREATMENT (OUTPATIENT)
Dept: PHYSICAL THERAPY | Facility: CLINIC | Age: 77
End: 2022-02-28

## 2022-02-28 DIAGNOSIS — M51.36 DDD (DEGENERATIVE DISC DISEASE), LUMBAR: ICD-10-CM

## 2022-02-28 DIAGNOSIS — M25.551 RIGHT HIP PAIN: ICD-10-CM

## 2022-02-28 DIAGNOSIS — R29.898 WEAKNESS OF RIGHT HIP: ICD-10-CM

## 2022-02-28 DIAGNOSIS — M41.25 OTHER IDIOPATHIC SCOLIOSIS, THORACOLUMBAR REGION: ICD-10-CM

## 2022-02-28 DIAGNOSIS — Z96.649 S/P REVISION OF TOTAL HIP: Primary | ICD-10-CM

## 2022-02-28 PROCEDURE — 97140 MANUAL THERAPY 1/> REGIONS: CPT | Performed by: PHYSICAL THERAPIST

## 2022-02-28 PROCEDURE — 97110 THERAPEUTIC EXERCISES: CPT | Performed by: PHYSICAL THERAPIST

## 2022-02-28 PROCEDURE — 97530 THERAPEUTIC ACTIVITIES: CPT | Performed by: PHYSICAL THERAPIST

## 2022-03-08 ENCOUNTER — TREATMENT (OUTPATIENT)
Dept: PHYSICAL THERAPY | Facility: CLINIC | Age: 77
End: 2022-03-08

## 2022-03-08 DIAGNOSIS — R29.898 WEAKNESS OF RIGHT HIP: ICD-10-CM

## 2022-03-08 DIAGNOSIS — M51.36 DDD (DEGENERATIVE DISC DISEASE), LUMBAR: ICD-10-CM

## 2022-03-08 DIAGNOSIS — M25.551 RIGHT HIP PAIN: ICD-10-CM

## 2022-03-08 DIAGNOSIS — Z96.649 S/P REVISION OF TOTAL HIP: Primary | ICD-10-CM

## 2022-03-08 DIAGNOSIS — M41.25 OTHER IDIOPATHIC SCOLIOSIS, THORACOLUMBAR REGION: ICD-10-CM

## 2022-03-08 PROCEDURE — 97140 MANUAL THERAPY 1/> REGIONS: CPT | Performed by: PHYSICAL THERAPIST

## 2022-03-08 PROCEDURE — 97530 THERAPEUTIC ACTIVITIES: CPT | Performed by: PHYSICAL THERAPIST

## 2022-03-08 PROCEDURE — 97110 THERAPEUTIC EXERCISES: CPT | Performed by: PHYSICAL THERAPIST

## 2022-03-08 NOTE — PROGRESS NOTES
"Physical Therapy Daily Progress Note    Patient: Maria Victoria Garcia  : 1945  Referring practitioner: Fred Cobb MD  Date of Initial Visit: Type: THERAPY  Noted: 2022  Today's Date: 3/8/2022  Patient seen for 9 sessions      Visit Diagnoses:    ICD-10-CM ICD-9-CM   1. S/P revision of total hip  Z96.649 V43.64   2. Right hip pain  M25.551 719.45   3. Weakness of right hip  R29.898 729.89   4. DDD (degenerative disc disease), lumbar  M51.36 722.52   5. Other idiopathic scoliosis, thoracolumbar region  M41.25 737.30       VISIT#: 9    Subjective   Maria Victoria Garcia reports that she no longer plans to contact her doctor to follow up for her GI/Pelvic issues. She explains that she used to have frequent bowel movements but believes the exercises are helping.  She inquires about finding a \"perfect\" shoe that doesn't need a lift. She explains increased pain across her back because she had to change her sheets this weekend (performed every 2 weeks) and that it took her about 3 hours with occasional breaks.     Pain Rating: increased pain across her back    Objective     See Exercise, Manual, and Modality Logs for complete treatment.     Patient Education: Discussion of Pacers and racers, ILU massage, diaphragmatic breathing, difference between pelvic tilts and bridges, and progressing core stability.    Assessment & Plan     Assessment    Assessment details: Chelsey arrives this visit with decreased reports of hip related back pain, though increases in lumbar paraspinal pain after making her bed this weekend. Due to this report, manual treatment modified to include her reported painful areas. During manual treatment, tension was observed L>R paraspinals, R>L adductors, and L Psoas areas. After her inquiry about shoes containing lifts, walking mechanics observed: moderate trendelenburg swing to R side, decreased stance phase on R, and pronation of B ankles with R>L. Deficits observed to have improved, as she first " "presented with a significant trendelenburg swing, and L lateral lumbar compensation. Due to this presentation and Chelsey's inquiry, discussion of seeking additional support at [employer] to explore appropriate shoe lift and arch support, as customized shoes would require follow up with a podiatrist. Due to Chelsey's past reports of GI issues, ILU massage added to prevent dysfunctional coordination of hip and abdominal muscles. Diaphragmatic breathing added to improve coordination/engagement of abdominal muscles and facilitate lumbar stabilization. Upon demonstrating, Chelsey exhibits difficulty: requiring several verbal/tactile cues to portray appropriate engagement. Pelvic tilts performed on ball to emphasize abdominal isolation. Though Chelsey demonstrates improvements, she would benefit from continued skilled PT to improve her strength, trunk coordination, and weightbearing tolerance, which would improve her function.         Progress per Plan of Care and Progress strengthening /stabilization /functional activity     Progress note next visit. Discuss \"Better Bedder\" to decrease required effort for bed making.      Timed:         Manual Therapy:    13     mins  90349;     Therapeutic Exercise:    20     mins  12668;     Neuromuscular Otf:        mins  28947;    Therapeutic Activity:     12     mins  76586;     Gait Training:           mins  72245;     Ultrasound:          mins  26858;    Ionto                                   mins   67009  Self Care                            mins   71300    Un-Timed:  Electrical Stimulation:         mins  12334 ( );  Traction          mins 41434        Timed Treatment:   45   mins   Total Treatment:     45   mins    Natalee Berg,  PTA Student    Almaz Matta, PT  Physical Therapist  [place] License #: [id number]    I was present in the room guiding the student, directing the service, and making the skilled judgement for all services rendered.    "

## 2022-03-15 ENCOUNTER — TREATMENT (OUTPATIENT)
Dept: PHYSICAL THERAPY | Facility: CLINIC | Age: 77
End: 2022-03-15

## 2022-03-15 DIAGNOSIS — Z96.649 S/P REVISION OF TOTAL HIP: Primary | ICD-10-CM

## 2022-03-15 DIAGNOSIS — R29.898 WEAKNESS OF RIGHT HIP: ICD-10-CM

## 2022-03-15 DIAGNOSIS — M25.551 RIGHT HIP PAIN: ICD-10-CM

## 2022-03-15 DIAGNOSIS — M51.36 DDD (DEGENERATIVE DISC DISEASE), LUMBAR: ICD-10-CM

## 2022-03-15 DIAGNOSIS — M41.25 OTHER IDIOPATHIC SCOLIOSIS, THORACOLUMBAR REGION: ICD-10-CM

## 2022-03-15 PROCEDURE — 97140 MANUAL THERAPY 1/> REGIONS: CPT | Performed by: PHYSICAL THERAPIST

## 2022-03-15 PROCEDURE — 97110 THERAPEUTIC EXERCISES: CPT | Performed by: PHYSICAL THERAPIST

## 2022-03-15 NOTE — PROGRESS NOTES
Physical Therapy Daily Progress Note    Patient: Maria Victoria Garcia  : 1945  Referring practitioner: Fred Cobb MD  Date of Initial Visit: Type: THERAPY  Noted: 2022  Today's Date: 3/15/2022  Patient seen for 10 sessions      Visit Diagnoses:    ICD-10-CM ICD-9-CM   1. S/P revision of total hip  Z96.649 V43.64   2. Right hip pain  M25.551 719.45   3. Weakness of right hip  R29.898 729.89   4. DDD (degenerative disc disease), lumbar  M51.36 722.52   5. Other idiopathic scoliosis, thoracolumbar region  M41.25 737.30       VISIT#: 10    Subjective   Maria Victoria Garcia reports that she is doing better.  She still has some pain and weakness in her hip but she is pleased with her improvements in her belly symptoms.  She states that therapy is really helping.  Pain Rating (0-10): 5    Objective     See Exercise, Manual, and Modality Logs for complete treatment.     Patient Education: Discussed treatment plan and progression of treatment    Assessment & Plan     Assessment    Assessment details: Patient still has moderate tightness throughout lumbosacral region.  She demonstrated quad weakness with step up and compensatory patterns pushing off with opposite leg and continued extensor lag with step up.        Progress per Plan of Care and Progress strengthening /stabilization /functional activity          Timed:         Manual Therapy:    15     mins  87006;     Therapeutic Exercise:    23     mins  17009;     Neuromuscular Otf:        mins  55949;    Therapeutic Activity:          mins  71833;     Gait Training:           mins  72428;     Ultrasound:          mins  68692;    Ionto                                   mins   67232  Self Care                            mins   76198  Canalith Repos                   mins  62217    Un-Timed:  Electrical Stimulation:         mins  27722 ( );  Dry Needling          mins 49075/  Traction          mins 69811  Re-Eval                               mins   32296    Timed Treatment:   38   mins   Total Treatment:     38   mins        Almaz Matta PT  Physical Therapist  [place] License: [id number]

## 2022-03-29 ENCOUNTER — TREATMENT (OUTPATIENT)
Dept: PHYSICAL THERAPY | Facility: CLINIC | Age: 77
End: 2022-03-29

## 2022-03-29 DIAGNOSIS — R29.898 WEAKNESS OF RIGHT HIP: ICD-10-CM

## 2022-03-29 DIAGNOSIS — Z96.649 S/P REVISION OF TOTAL HIP: Primary | ICD-10-CM

## 2022-03-29 DIAGNOSIS — M25.551 RIGHT HIP PAIN: ICD-10-CM

## 2022-03-29 DIAGNOSIS — M51.36 DDD (DEGENERATIVE DISC DISEASE), LUMBAR: ICD-10-CM

## 2022-03-29 DIAGNOSIS — M41.25 OTHER IDIOPATHIC SCOLIOSIS, THORACOLUMBAR REGION: ICD-10-CM

## 2022-03-29 PROCEDURE — 97140 MANUAL THERAPY 1/> REGIONS: CPT | Performed by: PHYSICAL THERAPIST

## 2022-03-29 PROCEDURE — 97110 THERAPEUTIC EXERCISES: CPT | Performed by: PHYSICAL THERAPIST

## 2022-03-29 NOTE — PROGRESS NOTES
Physical Therapy Daily Progress Note    Patient: Maria Victoria Garcia  : 1945  Referring practitioner: Fred Cobb MD  Date of Initial Visit: Type: THERAPY  Noted: 2022  Today's Date: 3/29/2022  Patient seen for 11 sessions      Visit Diagnoses:    ICD-10-CM ICD-9-CM   1. S/P revision of total hip  Z96.649 V43.64   2. Right hip pain  M25.551 719.45   3. Weakness of right hip  R29.898 729.89   4. DDD (degenerative disc disease), lumbar  M51.36 722.52   5. Other idiopathic scoliosis, thoracolumbar region  M41.25 737.30       VISIT#: 11    Subjective   Maria Victoria Garcia reports that she has had an increase in pain with standing and weightbearing since Friday.  She has minimal pain in sitting today but has increased pain up to 6/10 with weightbearing.  She reports a tender spot in her right buttock.     Pain Rating (0-10): 6/10  Lower Extremity Functional Scale: 38/80 points (previously 53/80 points)    Objective          Static Posture     Thoracic Spine  Convex curve left and shifted left.    Lumbar Spine   Lumbar spine (Left): Convex curve and shifted.     Pelvis   Pelvis (Right): Elevated.     Palpation     Right   Hypertonic in the piriformis. Tenderness of the piriformis and TFL.     Tenderness     Right Knee   Tenderness in the ITB.     Strength/Myotome Testing     Left Hip   Planes of Motion   Flexion: 4+  Abduction: 4+    Right Hip   Planes of Motion   Flexion: 4  Extension: 4-  Abduction: 4    Right Knee   Flexion: 4  Extension: 4    Right Ankle/Foot   Dorsiflexion: 4    Ambulation   Weight-Bearing Status   Assistive device used: none    Additional Weight-Bearing Status Details  Has a cane to use for painful days        See Exercise, Manual, and Modality Logs for complete treatment.     Patient Education: Discussed focus on gentle stretching and use of ice/heat.       Assessment & Plan     Assessment  Impairments: abnormal gait, abnormal or restricted ROM, activity intolerance, impaired physical  strength, lacks appropriate home exercise program and pain with function  Functional Limitations: carrying objects, lifting, sleeping, walking, pulling, pushing, uncomfortable because of pain, moving in bed, sitting and standing  Assessment details: Chelsey presents this visit for progress note.   She has been seen for 11/12 visits in outpatient physical therapy following her R hip revision.  She continues with pain during weightbearing and walking activities.  She has moderate tightness throughout R piriformis and along R ITB.  She has responded well to treatment overall but has had a recent flare in her pain.  Pt would benefit from continued skilled therapy to increase strength and build endurance to improve function.  Prognosis: fair    Goals  Plan Goals: STGs 4 weeks:  1. Pt indep in light HEP for R hip and L LBP- MET  2. Pt to show improved R hip abduction from 4- to 4/5 strength- MET  3. R hip ER/IR AROM to improve by 5-10 degrees from baseline- PARTIALLY MET  4. Pt to state improved tolerance to standing for 30 minutes before LBP- PARTIALLY MET  5. Pt to consider obtaining imaging for lumbar spine to manage possible issues down the road- NOT MET  LTGs 8-12 weeks:  1. Pt to have a good HEP in place and ex 3x/week - PARTIALLY MET  2. Pt to return to working 4 hour shifts, 2-3 days/week with no significant LBP - NOT MET  3. Pt to state good comfort for gait pattern with appropriate lift to L shoe - NOT MET  4. LBP no more than 2/10 with ADLs.- PARTIALLY MET  5. LEFS score to improve from 53 to > 63/80 by dc- NOT MET    Plan  Therapy options: will be seen for skilled therapy services  Planned modality interventions: TENS, ultrasound, thermotherapy (hydrocollator packs) and cryotherapy  Planned therapy interventions: manual therapy, neuromuscular re-education, soft tissue mobilization, strengthening, stretching, home exercise program, therapeutic activities, functional ROM exercises, flexibility,  balance/weight-bearing training, ADL retraining, abdominal trunk stabilization and joint mobilization  Frequency: 1x week  Duration in visits: 1  Duration in weeks: 1  Treatment plan discussed with: patient        Progress per Plan of Care and Progress strengthening /stabilization /functional activity.  Patient has one remaining visit on original plan of care.            Timed:         Manual Therapy:    23     mins  58718;     Therapeutic Exercise:    15     mins  39339;     Neuromuscular Otf:        mins  72263;    Therapeutic Activity:          mins  33839;     Gait Training:           mins  03630;     Ultrasound:          mins  90394;    Ionto                                   mins   53410  Self Care                            mins   95983  Canalith Repos                   mins  47023    Un-Timed:  Electrical Stimulation:         mins  12906 ( );  Dry Needling          mins 91408/94263  Traction          mins 19664  Re-Eval                               mins  90056    Timed Treatment:   38   mins   Total Treatment:     48   mins        Almaz Matta PT  Physical Therapist  [place] License: [id number]

## 2022-04-04 ENCOUNTER — TREATMENT (OUTPATIENT)
Dept: PHYSICAL THERAPY | Facility: CLINIC | Age: 77
End: 2022-04-04

## 2022-04-04 DIAGNOSIS — R29.898 WEAKNESS OF RIGHT HIP: ICD-10-CM

## 2022-04-04 DIAGNOSIS — Z96.649 S/P REVISION OF TOTAL HIP: Primary | ICD-10-CM

## 2022-04-04 DIAGNOSIS — M25.551 RIGHT HIP PAIN: ICD-10-CM

## 2022-04-04 DIAGNOSIS — M41.25 OTHER IDIOPATHIC SCOLIOSIS, THORACOLUMBAR REGION: ICD-10-CM

## 2022-04-04 DIAGNOSIS — M51.36 DDD (DEGENERATIVE DISC DISEASE), LUMBAR: ICD-10-CM

## 2022-04-04 PROCEDURE — 97110 THERAPEUTIC EXERCISES: CPT | Performed by: PHYSICAL THERAPIST

## 2022-04-04 PROCEDURE — 97140 MANUAL THERAPY 1/> REGIONS: CPT | Performed by: PHYSICAL THERAPIST

## 2022-04-04 NOTE — PROGRESS NOTES
Physical Therapy Daily Progress Note / Discharge Summary    Patient: Maria Victoria Garcia  : 1945  Referring practitioner: Fred Cobb MD  Date of Initial Visit: Type: THERAPY  Noted: 2022  Today's Date: 2022  Patient seen for 12 sessions      Visit Diagnoses:    ICD-10-CM ICD-9-CM   1. S/P revision of total hip  Z96.649 V43.64   2. Right hip pain  M25.551 719.45   3. Weakness of right hip  R29.898 729.89   4. DDD (degenerative disc disease), lumbar  M51.36 722.52   5. Other idiopathic scoliosis, thoracolumbar region  M41.25 737.30       VISIT#: 12    Subjective   Maria Victoria Garcia reports that she has been feeling a lot better since her last visit and has had no pain.  She isn't sure what causes the intermittent flares in her hip pain but she is doing well overall.    Pain Rating (0-10): 0    Objective     See Exercise, Manual, and Modality Logs for complete treatment.     Patient Education: Reviewed final HEP and patient has updated copy of [employer] handout and access code    Assessment & Plan     Assessment    Assessment details: Patient has been seen for  visits in outpatient physical therapy following her R hip revision.  She has had minimal pain in the past week and is doing well with her HEP.   She continues with intermittent hip pain but isn't able to recognize her triggers.  She is independent with her current home program and should do well with discharge from physical therapy at this time.    Goals  Plan Goals: STGs 4 weeks:  1. Pt indep in light HEP for R hip and L LBP- MET  2. Pt to show improved R hip abduction from 4- to 4/5 strength- MET  3. R hip ER/IR AROM to improve by 5-10 degrees from baseline- PARTIALLY MET  4. Pt to state improved tolerance to standing for 30 minutes before LBP- PARTIALLY MET  5. Pt to consider obtaining imaging for lumbar spine to manage possible issues down the road- NOT MET  LTGs 8-12 weeks:  1. Pt to have a good HEP in place and ex 3x/week - MET  2. Pt to  return to working 4 hour shifts, 2-3 days/week with no significant LBP -PARTIALLY MET  3. Pt to state good comfort for gait pattern with appropriate lift to L shoe - PARTIALLY MET, patient to buy new shoes this week  4. LBP no more than 2/10 with ADLs.- PARTIALLY MET  5. LEFS score to improve from 53 to > 63/80 by dc- PARTIALLY MET    Plan  Treatment plan discussed with: patient  Plan details: Discharge to final [facility] at this time.                  Timed:         Manual Therapy:    23     mins  69025;     Therapeutic Exercise:    15     mins  61819;     Neuromuscular Otf:        mins  19263;    Therapeutic Activity:          mins  85224;     Gait Training:           mins  24521;     Ultrasound:          mins  24277;    Ionto                                   mins   73887  Self Care                            mins   23160  Canalith Repos                   mins  70356    Un-Timed:  Electrical Stimulation:         mins  34059 ( );  Dry Needling          mins 87096/[id number]  Traction          mins 85134  Re-Eval                               mins  68115    Timed Treatment:   38   mins   Total Treatment:     38   mins        Almaz Matta PT  Physical Therapist  [place] License: [id number]

## 2023-03-21 ENCOUNTER — OFFICE VISIT (OUTPATIENT)
Dept: ORTHOPEDIC SURGERY | Facility: CLINIC | Age: 78
End: 2023-03-21
Payer: MEDICARE

## 2023-03-21 VITALS — BODY MASS INDEX: 25.86 KG/M2 | HEIGHT: 61 IN | TEMPERATURE: 98.3 F | RESPIRATION RATE: 16 BRPM | WEIGHT: 137 LBS

## 2023-03-21 DIAGNOSIS — M21.70 LEG LENGTH DISCREPANCY: ICD-10-CM

## 2023-03-21 DIAGNOSIS — R52 PAIN: Primary | ICD-10-CM

## 2023-03-21 PROCEDURE — 99213 OFFICE O/P EST LOW 20 MIN: CPT | Performed by: NURSE PRACTITIONER

## 2023-03-21 PROCEDURE — 73502 X-RAY EXAM HIP UNI 2-3 VIEWS: CPT | Performed by: NURSE PRACTITIONER

## 2023-03-21 RX ORDER — CIPROFLOXACIN 250 MG/1
TABLET, FILM COATED ORAL
COMMUNITY
Start: 2023-03-13

## 2023-03-21 RX ORDER — LOSARTAN POTASSIUM 25 MG/1
TABLET ORAL
COMMUNITY
Start: 2022-05-01

## 2023-03-21 RX ORDER — LOSARTAN POTASSIUM 50 MG/1
TABLET ORAL
COMMUNITY
Start: 2023-03-20

## 2023-03-21 NOTE — PROGRESS NOTES
"Maria Victoria Garcia : 1945 MRN: [id number] DATE: 3/21/2023    Chief Complaint:  Follow up right total hip with worsening symptoms    SUBJECTIVE:Patient returns today for a  follow up of right total hip replacement. Patient is status post polyethylene liner exchange 2022 by Dr. Cobb.  Patient subsequently had 2 previous surgeries by Dr. Emile Ruiz of the right hip.  Patient reports that she is still having moderate amount of pain off-and-on to her groin region.  Patient states that she is got obvious leg length discrepancies where her left lower extremity is disproportionate to her right leg.  Patient states that his gait disturbances causes her to be off balance and causes a moderate amount of pain to her left groin.  She is here today for further evaluation.    OBJECTIVE:    Temp 98.3 °F (36.8 °C) (Temporal)   Resp 16   Ht 154.9 cm (60.98\")   Wt 62.1 kg (137 lb)   BMI 25.90 kg/m²   Family History   Problem Relation Age of Onset   • Lung cancer Other    • Pancreatic cancer Other    • Cancer Brother    • Cancer Sister    • Cancer Mother    • Malig Hyperthermia Neg Hx      Past Medical History:   Diagnosis Date   • Ankle sprain    • Cancer (HCC)    • Fracture of ankle    • Fracture of wrist    • Fracture, finger    • Fracture, foot    • Frozen shoulder    • History of gallbladder cancer    • History of transfusion    • OA (osteoarthritis) of hip    • PONV (postoperative nausea and vomiting)    • Right hip pain    • Wrist sprain      Past Surgical History:   Procedure Laterality Date   • ANKLE OPEN REDUCTION INTERNAL FIXATION     • APPENDECTOMY     • COLONOSCOPY     • GALLBLADDER SURGERY  1998   • HIP MINI REVISION Right 2020    Procedure: TOTAL HIP ARTHROPLASTY liner exchange;  Surgeon: Fred Cobb MD;  Location: [facility];  Service: Orthopedics;  Laterality: Right;   • HIP SURGERY Bilateral     ALSO ; REPLACEMENT   • JOINT REPLACEMENT     • NASAL SEPTUM SURGERY   "   • ORIF ANKLE FRACTURE Left      Social History     Socioeconomic History   • Marital status:    Tobacco Use   • Smoking status: Never   • Smokeless tobacco: Never   Vaping Use   • Vaping Use: Never used   Substance and Sexual Activity   • Alcohol use: No   • Drug use: Never   • Sexual activity: Not Currently       Review of Systems: 14 point review of systems performed pertinent positives and negatives discussed above, all other systems are negative    Exam:. The incision is well healed. Range of motion is good without irritability. The calf is soft and nontender with a negative Homans sign. Alignment is neutral. Leg lengths are equal. Good hip flexion and abduction strength.Walks with nonantalgic gait. Intact to light touch with palpable distal pulses.  Left lower extremity is approximately 1.5 cm shorter than the right lower extremity.    DIAGNOSTIC STUDIES  Xrays:Xrays 2 view right hip AP and lateral were ordered and reviewed for evaluation of total hip which demonstrate a well positioned KELLI without complicating factors.  In comparison to previous films are no changes.    ASSESSMENT:    Follow up right hip replacement /left leg discrepancies       PLAN:     What options as well as imaging results were discussed in detail with the patient.  Did also consult with Dr. Cobb on this case.  Would like to proceed forward with conservative measures as surgical measures would mean a complete removal of hardware and revision of a right total hip.  Dr. Cobb states that this would be a very high risk surgery and does not know that it would significantly benefit the patient.  Therefore we are going to try to conservatively treat this patient.  We are going to refer her over to the [facility] clinic for treatment options and further evaluations for custom made orthotics.  Fully this will fix her leg length discrepancies while she is ambulating and walking in the public.  If patient not satisfied with her outcomes  following this that she is to follow-up with Dr. Cobb.    Mk Barnett, APRN  3/21/2023

## 2024-03-25 ENCOUNTER — OFFICE (OUTPATIENT)
Dept: URBAN - METROPOLITAN AREA CLINIC 64 | Facility: CLINIC | Age: 79
End: 2024-03-25

## 2024-03-25 VITALS
HEIGHT: 60 IN | WEIGHT: 118 LBS | SYSTOLIC BLOOD PRESSURE: 132 MMHG | HEART RATE: 67 BPM | DIASTOLIC BLOOD PRESSURE: 81 MMHG

## 2024-03-25 DIAGNOSIS — R63.0 ANOREXIA: ICD-10-CM

## 2024-03-25 DIAGNOSIS — K59.00 CONSTIPATION, UNSPECIFIED: ICD-10-CM

## 2024-03-25 PROCEDURE — 99214 OFFICE O/P EST MOD 30 MIN: CPT

## 2024-06-03 ENCOUNTER — OFFICE (OUTPATIENT)
Dept: URBAN - METROPOLITAN AREA CLINIC 64 | Facility: CLINIC | Age: 79
End: 2024-06-03

## 2024-06-03 VITALS
DIASTOLIC BLOOD PRESSURE: 60 MMHG | WEIGHT: 115 LBS | HEIGHT: 60 IN | SYSTOLIC BLOOD PRESSURE: 130 MMHG | DIASTOLIC BLOOD PRESSURE: 77 MMHG | HEART RATE: 43 BPM | SYSTOLIC BLOOD PRESSURE: 143 MMHG

## 2024-06-03 DIAGNOSIS — R63.0 ANOREXIA: ICD-10-CM

## 2024-06-03 DIAGNOSIS — K59.00 CONSTIPATION, UNSPECIFIED: ICD-10-CM

## 2024-06-03 DIAGNOSIS — R15.2 FECAL URGENCY: ICD-10-CM

## 2024-06-03 PROCEDURE — 99214 OFFICE O/P EST MOD 30 MIN: CPT

## 2024-06-03 RX ORDER — DICYCLOMINE HYDROCHLORIDE 10 MG/1
CAPSULE ORAL
Qty: 90 | Refills: 2 | Status: ACTIVE
Start: 2024-06-03

## 2024-09-03 ENCOUNTER — OFFICE (OUTPATIENT)
Dept: URBAN - METROPOLITAN AREA CLINIC 64 | Facility: CLINIC | Age: 79
End: 2024-09-03

## 2024-09-03 ENCOUNTER — OFFICE (OUTPATIENT)
Age: 79
End: 2024-09-03

## 2024-09-03 VITALS
HEIGHT: 60 IN | SYSTOLIC BLOOD PRESSURE: 133 MMHG | SYSTOLIC BLOOD PRESSURE: 133 MMHG | WEIGHT: 110 LBS | WEIGHT: 110 LBS | WEIGHT: 110 LBS | DIASTOLIC BLOOD PRESSURE: 76 MMHG | WEIGHT: 110 LBS | SYSTOLIC BLOOD PRESSURE: 133 MMHG | WEIGHT: 110 LBS | HEART RATE: 58 BPM | DIASTOLIC BLOOD PRESSURE: 76 MMHG | HEIGHT: 60 IN | DIASTOLIC BLOOD PRESSURE: 76 MMHG | HEART RATE: 58 BPM | DIASTOLIC BLOOD PRESSURE: 76 MMHG | HEIGHT: 60 IN | HEART RATE: 58 BPM | HEART RATE: 58 BPM | SYSTOLIC BLOOD PRESSURE: 133 MMHG | HEIGHT: 60 IN | HEART RATE: 58 BPM | HEIGHT: 60 IN | SYSTOLIC BLOOD PRESSURE: 133 MMHG | WEIGHT: 110 LBS | SYSTOLIC BLOOD PRESSURE: 133 MMHG | DIASTOLIC BLOOD PRESSURE: 76 MMHG | DIASTOLIC BLOOD PRESSURE: 76 MMHG | DIASTOLIC BLOOD PRESSURE: 76 MMHG | HEIGHT: 60 IN | HEIGHT: 60 IN | HEART RATE: 58 BPM | WEIGHT: 110 LBS | SYSTOLIC BLOOD PRESSURE: 133 MMHG | HEART RATE: 58 BPM

## 2024-09-03 DIAGNOSIS — R63.0 ANOREXIA: ICD-10-CM

## 2024-09-03 PROCEDURE — 99213 OFFICE O/P EST LOW 20 MIN: CPT

## 2024-12-04 ENCOUNTER — OFFICE (OUTPATIENT)
Age: 79
End: 2024-12-04
Payer: MEDICARE

## 2024-12-04 ENCOUNTER — OFFICE (OUTPATIENT)
Dept: URBAN - METROPOLITAN AREA CLINIC 64 | Facility: CLINIC | Age: 79
End: 2024-12-04
Payer: MEDICARE

## 2024-12-04 VITALS
DIASTOLIC BLOOD PRESSURE: 79 MMHG | HEIGHT: 60 IN | WEIGHT: 108 LBS | HEART RATE: 72 BPM | SYSTOLIC BLOOD PRESSURE: 127 MMHG | HEART RATE: 72 BPM | WEIGHT: 108 LBS | WEIGHT: 108 LBS | HEIGHT: 60 IN | HEIGHT: 60 IN | HEART RATE: 72 BPM | SYSTOLIC BLOOD PRESSURE: 127 MMHG | SYSTOLIC BLOOD PRESSURE: 127 MMHG | HEIGHT: 60 IN | WEIGHT: 108 LBS | DIASTOLIC BLOOD PRESSURE: 79 MMHG | WEIGHT: 108 LBS | SYSTOLIC BLOOD PRESSURE: 127 MMHG | SYSTOLIC BLOOD PRESSURE: 127 MMHG | HEART RATE: 72 BPM | HEIGHT: 60 IN | HEART RATE: 72 BPM | SYSTOLIC BLOOD PRESSURE: 127 MMHG | DIASTOLIC BLOOD PRESSURE: 79 MMHG | HEIGHT: 60 IN | HEART RATE: 72 BPM | DIASTOLIC BLOOD PRESSURE: 79 MMHG | HEIGHT: 60 IN | WEIGHT: 108 LBS | DIASTOLIC BLOOD PRESSURE: 79 MMHG | HEART RATE: 72 BPM | SYSTOLIC BLOOD PRESSURE: 127 MMHG | DIASTOLIC BLOOD PRESSURE: 79 MMHG | DIASTOLIC BLOOD PRESSURE: 79 MMHG | WEIGHT: 108 LBS

## 2024-12-04 DIAGNOSIS — R63.0 ANOREXIA: ICD-10-CM

## 2024-12-04 PROCEDURE — 99213 OFFICE O/P EST LOW 20 MIN: CPT

## (undated) DEVICE — GLV SURG SENSICARE W/ALOE PF LF 8 STRL

## (undated) DEVICE — SYR LUERLOK 30CC

## (undated) DEVICE — PREMIUM WET SKIN PREP TRAY: Brand: MEDLINE INDUSTRIES, INC.

## (undated) DEVICE — GLV SURG SENSICARE PI MIC PF SZ7 LF STRL

## (undated) DEVICE — KNOTLESS TISSUE CONTROL DEVICE, UNDYED UNIDIRECTIONAL (ANTIBACTERIAL) SYNTHETIC ABSORBABLE DEVICE
Type: IMPLANTABLE DEVICE | Site: HIP | Status: NON-FUNCTIONAL
Brand: STRATAFIX

## (undated) DEVICE — ADHS SKIN DERMABOND TOP ADVANCED

## (undated) DEVICE — DRAPE,REIN 53X77,STERILE: Brand: MEDLINE

## (undated) DEVICE — PREP SOL POVIDONE/IODINE BT 4OZ

## (undated) DEVICE — DRSNG SURESITE WNDW 2.38X2.75

## (undated) DEVICE — DRSNG SURESITE WNDW 4X4.5

## (undated) DEVICE — GLV SURG SENSICARE PI PF LF 7 GRN STRL

## (undated) DEVICE — APPL DURAPREP IODOPHOR APL 26ML

## (undated) DEVICE — SUT VIC 1 CT1 36IN J947H

## (undated) DEVICE — SUT VIC 0 CT1 36IN J946H

## (undated) DEVICE — PK HIP TOTL 40

## (undated) DEVICE — PENCL E/S ULTRAVAC TELESCP NOSE HOLSTR 10FT

## (undated) DEVICE — HANDPIECE SET WITH COAXIAL HIGH FLOW TIP AND SUCTION TUBE: Brand: INTERPULSE

## (undated) DEVICE — 3M™ IOBAN™ 2 ANTIMICROBIAL INCISE DRAPE 6640EZ: Brand: IOBAN™ 2

## (undated) DEVICE — GLV SURG SENSICARE W/ALOE PF LF 7.5 STRL

## (undated) DEVICE — ANTIBACTERIAL UNDYED BRAIDED (POLYGLACTIN 910), SYNTHETIC ABSORBABLE SUTURE: Brand: COATED VICRYL

## (undated) DEVICE — GLV SURG PREMIERPRO ORTHO LTX PF SZ7.5 BRN

## (undated) DEVICE — MAT FLR ABSORBENT LG 4FT 10 2.5FT

## (undated) DEVICE — TRAP FLD MINIVAC MEGADYNE 100ML

## (undated) DEVICE — OPTIFOAM GENTLE SA, POSTOP, 4X8: Brand: MEDLINE

## (undated) DEVICE — DRSNG GZ PETROLTM XEROFORM CURAD 1X8IN STRL